# Patient Record
Sex: FEMALE | Race: WHITE | HISPANIC OR LATINO | Employment: FULL TIME | URBAN - METROPOLITAN AREA
[De-identification: names, ages, dates, MRNs, and addresses within clinical notes are randomized per-mention and may not be internally consistent; named-entity substitution may affect disease eponyms.]

---

## 2018-05-17 ENCOUNTER — HOSPITAL PATHOLOGY (OUTPATIENT)
Dept: OTHER | Facility: CLINIC | Age: 43
End: 2018-05-17

## 2018-05-18 LAB — COPATH REPORT: NORMAL

## 2018-08-19 ENCOUNTER — APPOINTMENT (OUTPATIENT)
Dept: ULTRASOUND IMAGING | Facility: CLINIC | Age: 43
End: 2018-08-19
Attending: EMERGENCY MEDICINE
Payer: COMMERCIAL

## 2018-08-19 ENCOUNTER — HOSPITAL ENCOUNTER (EMERGENCY)
Facility: CLINIC | Age: 43
Discharge: HOME | End: 2018-08-19
Attending: EMERGENCY MEDICINE | Admitting: EMERGENCY MEDICINE
Payer: COMMERCIAL

## 2018-08-19 ENCOUNTER — APPOINTMENT (OUTPATIENT)
Dept: CT IMAGING | Facility: CLINIC | Age: 43
End: 2018-08-19
Attending: EMERGENCY MEDICINE
Payer: COMMERCIAL

## 2018-08-19 VITALS
TEMPERATURE: 98.2 F | DIASTOLIC BLOOD PRESSURE: 74 MMHG | BODY MASS INDEX: 25.25 KG/M2 | HEART RATE: 68 BPM | SYSTOLIC BLOOD PRESSURE: 128 MMHG | RESPIRATION RATE: 18 BRPM | WEIGHT: 125 LBS | OXYGEN SATURATION: 100 %

## 2018-08-19 DIAGNOSIS — R10.32 ABDOMINAL PAIN, LEFT LOWER QUADRANT: ICD-10-CM

## 2018-08-19 LAB
ALBUMIN UR-MCNC: NEGATIVE MG/DL
ANION GAP SERPL CALCULATED.3IONS-SCNC: 6 MMOL/L (ref 3–14)
APPEARANCE UR: CLEAR
BASOPHILS # BLD AUTO: 0 10E9/L (ref 0–0.2)
BASOPHILS NFR BLD AUTO: 0.6 %
BILIRUB UR QL STRIP: NEGATIVE
BUN SERPL-MCNC: 12 MG/DL (ref 7–30)
CALCIUM SERPL-MCNC: 7.9 MG/DL (ref 8.5–10.1)
CHLORIDE SERPL-SCNC: 109 MMOL/L (ref 94–109)
CO2 SERPL-SCNC: 25 MMOL/L (ref 20–32)
COLOR UR AUTO: YELLOW
CREAT SERPL-MCNC: 0.68 MG/DL (ref 0.52–1.04)
DIFFERENTIAL METHOD BLD: NORMAL
EOSINOPHIL # BLD AUTO: 0.2 10E9/L (ref 0–0.7)
EOSINOPHIL NFR BLD AUTO: 3.3 %
ERYTHROCYTE [DISTWIDTH] IN BLOOD BY AUTOMATED COUNT: 12.8 % (ref 10–15)
GFR SERPL CREATININE-BSD FRML MDRD: >90 ML/MIN/1.7M2
GLUCOSE SERPL-MCNC: 91 MG/DL (ref 70–99)
GLUCOSE UR STRIP-MCNC: NEGATIVE MG/DL
HCG UR QL: NEGATIVE
HCT VFR BLD AUTO: 39.7 % (ref 35–47)
HGB BLD-MCNC: 13.2 G/DL (ref 11.7–15.7)
HGB UR QL STRIP: ABNORMAL
IMM GRANULOCYTES # BLD: 0 10E9/L (ref 0–0.4)
IMM GRANULOCYTES NFR BLD: 0.5 %
KETONES UR STRIP-MCNC: NEGATIVE MG/DL
LEUKOCYTE ESTERASE UR QL STRIP: NEGATIVE
LYMPHOCYTES # BLD AUTO: 2 10E9/L (ref 0.8–5.3)
LYMPHOCYTES NFR BLD AUTO: 31 %
MCH RBC QN AUTO: 30.7 PG (ref 26.5–33)
MCHC RBC AUTO-ENTMCNC: 33.2 G/DL (ref 31.5–36.5)
MCV RBC AUTO: 92 FL (ref 78–100)
MONOCYTES # BLD AUTO: 0.5 10E9/L (ref 0–1.3)
MONOCYTES NFR BLD AUTO: 8.1 %
MUCOUS THREADS #/AREA URNS LPF: PRESENT /LPF
NEUTROPHILS # BLD AUTO: 3.6 10E9/L (ref 1.6–8.3)
NEUTROPHILS NFR BLD AUTO: 56.5 %
NITRATE UR QL: NEGATIVE
NRBC # BLD AUTO: 0 10*3/UL
NRBC BLD AUTO-RTO: 0 /100
PH UR STRIP: 6 PH (ref 5–7)
PLATELET # BLD AUTO: 235 10E9/L (ref 150–450)
POTASSIUM SERPL-SCNC: 3.8 MMOL/L (ref 3.4–5.3)
RBC # BLD AUTO: 4.3 10E12/L (ref 3.8–5.2)
RBC #/AREA URNS AUTO: 2 /HPF (ref 0–2)
SODIUM SERPL-SCNC: 140 MMOL/L (ref 133–144)
SOURCE: ABNORMAL
SP GR UR STRIP: 1.01 (ref 1–1.03)
SQUAMOUS #/AREA URNS AUTO: <1 /HPF (ref 0–1)
UROBILINOGEN UR STRIP-MCNC: 0 MG/DL (ref 0–2)
WBC # BLD AUTO: 6.4 10E9/L (ref 4–11)
WBC #/AREA URNS AUTO: 1 /HPF (ref 0–5)

## 2018-08-19 PROCEDURE — 81025 URINE PREGNANCY TEST: CPT | Performed by: EMERGENCY MEDICINE

## 2018-08-19 PROCEDURE — 81001 URINALYSIS AUTO W/SCOPE: CPT | Performed by: EMERGENCY MEDICINE

## 2018-08-19 PROCEDURE — 85025 COMPLETE CBC W/AUTO DIFF WBC: CPT | Performed by: EMERGENCY MEDICINE

## 2018-08-19 PROCEDURE — 99284 EMERGENCY DEPT VISIT MOD MDM: CPT | Mod: 25

## 2018-08-19 PROCEDURE — 80048 BASIC METABOLIC PNL TOTAL CA: CPT | Performed by: EMERGENCY MEDICINE

## 2018-08-19 PROCEDURE — 76830 TRANSVAGINAL US NON-OB: CPT

## 2018-08-19 PROCEDURE — 74176 CT ABD & PELVIS W/O CONTRAST: CPT

## 2018-08-19 RX ORDER — IBUPROFEN 200 MG
600 TABLET ORAL EVERY 6 HOURS PRN
Qty: 30 TABLET | Refills: 0 | Status: SHIPPED | OUTPATIENT
Start: 2018-08-19 | End: 2019-05-06

## 2018-08-19 ASSESSMENT — ENCOUNTER SYMPTOMS
NAUSEA: 0
FREQUENCY: 0
DIARRHEA: 0
VOMITING: 0
ABDOMINAL PAIN: 1
BACK PAIN: 1
BLOOD IN STOOL: 0
HEMATURIA: 0
MYALGIAS: 1
DYSURIA: 0
FEVER: 0

## 2018-08-19 NOTE — ED PROVIDER NOTES
History     Chief Complaint:  LLQ Abdominal Pain    HPI   History provided via phone .     Brandi Powers is a 43 year old female who presents with left lower quadrant abdominal pain. The patient reports LLQ abdominal pain since this morning that she describes as a pressure that comes and goes. She also endorses lower back pain. There is nothing that provokes or alleviates her pain, and her pain is improved currently in the ED. She otherwise denies fever, nausea, vomiting, diarrhea, abnormal stools, or unusual urinary symptoms. The patient is s/p ovarian cystectomy of the left side (6/2014), but says her pain today is different. She is currently menstruating.      Allergies:  No known drug allergies     Medications:    Ibuprofen    Past Medical History:    Benign ovarian cyst  - left    Menometrorrhagia     Past Surgical History:    IUD  Ovarian cystectomy  Tubal ligation    Family History:    Hypertension    Social History:  Smoking Status: Never Smoker  Alcohol Use: No  Patient presents with family.  Marital Status:        Review of Systems   Constitutional: Negative for fever.   Gastrointestinal: Positive for abdominal pain. Negative for blood in stool, diarrhea, nausea and vomiting.   Genitourinary: Positive for pelvic pain. Negative for dysuria, frequency, hematuria and urgency.   Musculoskeletal: Positive for back pain and myalgias.   All other systems reviewed and are negative.    Physical Exam     Patient Vitals for the past 24 hrs:   BP Temp Temp src Pulse Resp SpO2 Weight   08/19/18 2243 - - - - - 100 % -   08/19/18 2241 128/74 - - - - - -   08/19/18 2145 (!) 137/95 - - - - 97 % -   08/19/18 2130 128/77 - - - - 100 % -   08/19/18 1900 128/81 - - - - 100 % -   08/19/18 1845 132/84 - - - - 100 % -   08/19/18 1830 127/88 - - - - - -   08/19/18 1758 135/73 98.2  F (36.8  C) Oral 68 18 98 % 56.7 kg (125 lb)     Physical Exam   HENT: Oropharynx clear, mucous membranes moist   EYES:  Extraocular movements intact  CV: Rate as noted,  regular rhythm. no murmurs.   RESP: Effort normal. Breath sounds are normal bilaterally.  GI: Abdomen with mild left lower quadrant tenderness.  NEURO: Alert, moving all extremities  MSK: No deformity of the extremities  SKIN: Warm and dry    Emergency Department Course     Imaging:  Radiographic findings were communicated with the patient who voiced understanding of the findings.    Ultrasound Pelvis Complete w/ Transvag and Doppler:  1. Normal ovaries.  2. Thickened uterine endometrium and small endometrial fluid  collection. Clinical correlation with the patient's menstrual status  is requested.  As read by Radiology.    CT-scan Abdomen/Pelvis w/o contrast:  No acute abnormality.  Preliminary result per radiology.     Laboratory:  CBC: WNL (WBC 6.4, HGB 13.2, )  BMP: Calcium 7.9 (L), o/w WNL (Creatinine 0.68)    UA: Blood small, Mucous present, o/w Negative  HCG Urine: Negative    Emergency Department Course:  Past medical records, nursing notes, and vitals reviewed.  1824: I performed an exam of the patient and obtained history, as documented above.    IV inserted and blood drawn.  The patient provided a urine sample here in the emergency department. This was sent for laboratory testing, findings above.  The patient was sent for a ultrasound while in the emergency department, findings above.    2030: I rechecked the patient. Explained findings to patient. Patient would like a CT scan.     2055: Patient would like to go home without the CT scan.    2101: Patient changes her mind and would like a CT for rule out.     2226: I rechecked the patient. Findings and plan explained to the Patient. Patient discharged home with instructions regarding supportive care, medications, and reasons to return. The importance of close follow-up was reviewed.     Impression & Plan      Medical Decision Making:  This patient presented to the ER with left lower quadrant pain.   The patient did not have any peritoneal signs or significant tenderness upon arrival.  Given the location of the pain I initially considered ovarian processes and obtained a pelvic ultrasound.  This was negative for acute pathology.  Her urinalysis did not show signs of infection.  Her pregnancy testing was negative which makes ectopic unlikely.  She has no leukocytosis to suggest occult infection.  At this point given the waxing and waning nature of the patient's pain, renal stone was still in the differential.  I discussed observation at home versus CT imaging and the patient preferred CT imaging.  This was fortunately negative for any acute pathology.  The patient was discharged home with close follow-up.  Return precautions were discussed.    Diagnosis:    ICD-10-CM    1. Abdominal pain, left lower quadrant R10.32      Disposition:  Discharged to home.    Robel Florez  8/19/2018   [facility] EMERGENCY DEPARTMENT  I, Robel Florez, am serving as a scribe at 6:24 PM on 8/19/2018 to document services personally performed by Joseph Wolfe MD based on my observations and the provider's statements to me.         Joseph Wolfe MD  08/20/18 0223

## 2018-08-19 NOTE — ED AVS SNAPSHOT
[facility] Emergency Department    [address]    [facility] [address]    Phone:  [phone]    Fax:  [phone]                                       Brandi Powers   MRN: [id number]    Department:  [facility] Emergency Department   Date of Visit:  8/19/2018           After Visit Summary Signature Page     I have received my discharge instructions, and my questions have been answered. I have discussed any challenges I see with this plan with the nurse or doctor.    ..........................................................................................................................................  Patient/Patient Representative Signature      ..........................................................................................................................................  Patient Representative Print Name and Relationship to Patient    ..................................................               ................................................  Date                                            Time    ..........................................................................................................................................  Reviewed by Signature/Title    ...................................................              ..............................................  Date                                                            Time

## 2018-08-19 NOTE — ED AVS SNAPSHOT
[facility] Emergency Department    [address] [place] [address]    Phone:  [phone]    Fax:  [phone]                                       Brandi Powers   MRN: [id number]    Department:  [facility] Emergency Department   Date of Visit:  8/19/2018           Patient Information     Date Of Birth          1975        Your diagnoses for this visit were:     Abdominal pain, left lower quadrant        You were seen by Joseph Wolfe MD.      Follow-up Information     Follow up with [facility] Emergency Department.    Specialty:  EMERGENCY MEDICINE    Why:  As needed, If symptoms worsen    Contact information:    [address] [place] [id number] [phone]        Follow up with [facility], [place]. Schedule an appointment as soon as possible for a visit in 1 day.    Specialty:  Family Practice    Why:  follow up of ER visit    Contact information:    [address] [place] [address]  [phone]          Discharge Instructions       Discharge Instructions  Abdominal Pain    Abdominal pain (belly pain) can be caused by many things. Your evaluation today does not show the exact cause for your pain. Your provider today has decided that it is unlikely your pain is due to a life threatening problem, or a problem requiring surgery or hospital admission. Sometimes those problems cannot be found right away, so it is very important that you follow up as directed.  Sometimes only the changes which occur over time allow the cause of your pain to be found.    Generally, every Emergency Department visit should have a follow-up clinic visit with either a primary or a specialty clinic/provider. Please follow-up as instructed by your emergency provider today. With abdominal pain, we often recommend very close follow-up, such as the following day.    ADULTS:  Return to the Emergency Department right away if:      You get an  oral temperature above 102oF or as directed by your provider.    You have blood in your stools. This may be bright red or appear as black, tarry stools.      You keep vomiting (throwing up) or cannot drink liquids.    You see blood when you vomit.     You cannot have a bowel movement or you cannot pass gas.    Your stomach gets bloated or bigger.    Your skin or the whites of your eyes look yellow.    You faint.    You have bloody, frequent or painful urination (peeing).    You have new symptoms or anything that worries you.    CHILDREN:  Return to the Emergency Department right away if your child has any of the above-listed symptoms or the following:      Pushes your hand away or screams/cries when his/her belly is touched.    You notice your child is very fussy or weak.    Your child is very tired and is too tired to eat or drink.    Your child is dehydrated.  Signs of dehydration can be:  o Significant change in the amount of wet diapers/urine.  o Your infant or child starts to have dry mouth and lips, or no saliva (spit) or tears.    PREGNANT WOMEN:  Return to the Emergency Department right away if you have any of the above-listed symptoms or the following:      You have bleeding, leaking fluid or passing tissue from the vagina.    You have worse pain or cramping, or pain in your shoulder or back.    You have vomiting that will not stop.    You have a temperature of 100oF or more.    Your baby is not moving as much as usual.    You faint.    You get a bad headache with or without eye problems and abdominal pain.    You have a seizure.    You have unusual discharge from your vagina and abdominal pain.    Abdominal pain is pretty common during pregnancy.  Your pain may or may not be related to your pregnancy. You should follow-up closely with your OB provider so they can evaluate you and your baby.  Until you follow-up with your regular provider, do the following:       Avoid sex and do not put anything in your  "vagina.    Drink clear fluids.    Only take medications approved by your provider.    MORE INFORMATION:    Appendicitis:  A possible cause of abdominal pain in any person who still has their appendix is acute appendicitis. Appendicitis is often hard to diagnose.  Testing does not always rule out early appendicitis or other causes of abdominal pain. Close follow-up with your provider and re-evaluations may be needed to figure out the reason for your abdominal pain.    Follow-up:  It is very important that you make an appointment with your clinic and go to the appointment.  If you do not follow-up with your primary provider, it may result in missing an important development which could result in permanent injury or disability and/or lasting pain.  If there is any problem keeping your appointment, call your provider or return to the Emergency Department.    Medications:  Take your medications as directed by your provider today.  Before using over-the-counter medications, ask your provider and make sure to take the medications as directed.  If you have any questions about medications, ask your provider.    Diet:  Resume your normal diet as much as possible, but do not eat fried, fatty or spicy foods while you have pain.  Do not drink alcohol or have caffeine.  Do not smoke tobacco.    Probiotics: If you have been given an antibiotic, you may want to also take a probiotic pill or eat yogurt with live cultures. Probiotics have \"good bacteria\" to help your intestines stay healthy. Studies have shown that probiotics help prevent diarrhea (loose stools) and other intestine problems (including C. diff infection) when you take antibiotics. You can buy these without a prescription in the pharmacy section of the store.     If you were given a prescription for medicine here today, be sure to read all of the information (including the package insert) that comes with your prescription.  This will include important information about " the medicine, its side effects, and any warnings that you need to know about.  The pharmacist who fills the prescription can provide more information and answer questions you may have about the medicine.  If you have questions or concerns that the pharmacist cannot address, please call or return to the Emergency Department.       Remember that you can always come back to the Emergency Department if you are not able to see your regular provider in the amount of time listed above, if you get any new symptoms, or if there is anything that worries you.      24 Hour Appointment Hotline       To make an appointment at any [facility], call [phone] ([phone]). If you don't have a family doctor or clinic, we will help you find one. [facility] are conveniently located to serve the needs of you and your family.             Review of your medicines      CONTINUE these medicines which may have CHANGED, or have new prescriptions. If we are uncertain of the size of tablets/capsules you have at home, strength may be listed as something that might have changed.        Dose / Directions Last dose taken    ibuprofen 200 MG tablet   Commonly known as:  ADVIL/MOTRIN   Dose:  600 mg   What changed:    - medication strength  - how much to take  - when to take this  - reasons to take this   Quantity:  30 tablet        Take 3 tablets (600 mg) by mouth every 6 hours as needed for mild pain, pain or moderate pain   Refills:  0                Prescriptions were sent or printed at these locations (1 Prescription)                   Other Prescriptions                Printed at Department/Unit printer (1 of 1)         ibuprofen (ADVIL/MOTRIN) 200 MG tablet                Procedures and tests performed during your visit     Basic metabolic panel    CBC with platelets differential    CT Abdomen Pelvis w/o Contrast    HCG qualitative urine (UPT)    UA with Microscopic    US Pelvis Cmplt w Transvag & Doppler LmtPel Duplex  Limited      Orders Needing Specimen Collection     None      Pending Results     No orders found from 8/17/2018 to 8/20/2018.            Pending Culture Results     No orders found from 8/17/2018 to 8/20/2018.            Pending Results Instructions     If you had any lab results that were not finalized at the time of your Discharge, you can call the ED Lab Result RN at [phone]. You will be contacted by this team for any positive Lab results or changes in treatment. The nurses are available 7 days a week from 10A to 6:30P.  You can leave a message 24 hours per day and they will return your call.        Test Results From Your Hospital Stay        8/19/2018  6:57 PM      Component Results     Component Value Ref Range & Units Status    WBC 6.4 4.0 - 11.0 10e9/L Final    RBC Count 4.30 3.8 - 5.2 10e12/L Final    Hemoglobin 13.2 11.7 - 15.7 g/dL Final    Hematocrit 39.7 35.0 - 47.0 % Final    MCV 92 78 - 100 fl Final    MCH 30.7 26.5 - 33.0 pg Final    MCHC 33.2 31.5 - 36.5 g/dL Final    RDW 12.8 10.0 - 15.0 % Final    Platelet Count 235 150 - 450 10e9/L Final    Diff Method Automated Method  Final    % Neutrophils 56.5 % Final    % Lymphocytes 31.0 % Final    % Monocytes 8.1 % Final    % Eosinophils 3.3 % Final    % Basophils 0.6 % Final    % Immature Granulocytes 0.5 % Final    Nucleated RBCs 0 0 /100 Final    Absolute Neutrophil 3.6 1.6 - 8.3 10e9/L Final    Absolute Lymphocytes 2.0 0.8 - 5.3 10e9/L Final    Absolute Monocytes 0.5 0.0 - 1.3 10e9/L Final    Absolute Eosinophils 0.2 0.0 - 0.7 10e9/L Final    Absolute Basophils 0.0 0.0 - 0.2 10e9/L Final    Abs Immature Granulocytes 0.0 0 - 0.4 10e9/L Final    Absolute Nucleated RBC 0.0  Final         8/19/2018  7:10 PM      Component Results     Component Value Ref Range & Units Status    Sodium 140 133 - 144 mmol/L Final    Potassium 3.8 3.4 - 5.3 mmol/L Final    Chloride 109 94 - 109 mmol/L Final    Carbon Dioxide 25 20 - 32 mmol/L Final    Anion Gap 6 3 - 14  mmol/L Final    Glucose 91 70 - 99 mg/dL Final    Urea Nitrogen 12 7 - 30 mg/dL Final    Creatinine 0.68 0.52 - 1.04 mg/dL Final    GFR Estimate >90 >60 mL/min/1.7m2 Final    Non  GFR Calc    GFR Estimate If Black >90 >60 mL/min/1.7m2 Final    African American GFR Calc    Calcium 7.9 (L) 8.5 - 10.1 mg/dL Final         8/19/2018  6:56 PM      Component Results     Component Value Ref Range & Units Status    Color Urine Yellow  Final    Appearance Urine Clear  Final    Glucose Urine Negative NEG^Negative mg/dL Final    Bilirubin Urine Negative NEG^Negative Final    Ketones Urine Negative NEG^Negative mg/dL Final    Specific Gravity Urine 1.008 1.003 - 1.035 Final    Blood Urine Small (A) NEG^Negative Final    pH Urine 6.0 5.0 - 7.0 pH Final    Protein Albumin Urine Negative NEG^Negative mg/dL Final    Urobilinogen mg/dL 0.0 0.0 - 2.0 mg/dL Final    Nitrite Urine Negative NEG^Negative Final    Leukocyte Esterase Urine Negative NEG^Negative Final    Source Midstream Urine  Final    WBC Urine 1 0 - 5 /HPF Final    RBC Urine 2 0 - 2 /HPF Final    Squamous Epithelial /HPF Urine <1 0 - 1 /HPF Final    Mucous Urine Present (A) NEG^Negative /LPF Final         8/19/2018  6:57 PM      Component Results     Component Value Ref Range & Units Status    HCG Qual Urine Negative NEG^Negative Final    This test is for screening purposes.  Results should be interpreted along with   the clinical picture.  Confirmation testing is available if warranted by   ordering [id number], HCG Quantitative Pregnancy.           8/19/2018  7:44 PM      Narrative     US PELVIS COMPLETE W TRANSVAGINAL AND DOPPLER LIMITED 8/19/2018 7:39  PM    HISTORY: Pelvic pain.    COMPARISON: None.    FINDINGS:  Transvaginal images were performed to better evaluate the  patient's uterus, ovaries and endometrial stripe.    The uterus measures 10.0 x 6.3 x 5.1 cm. Uterine fibroid measures 1.5  cm in maximum dimension. Endometrial stripe thickness is 1.4 cm.  Small  fluid collection in the lower uterine segment measures 0.9 cm.    Right ovary measures 3.1 cm. Left ovary measures 2.5 cm. No evidence  of ovarian cyst. Ovarian vascularity is normal bilaterally by waveform  Doppler.        Impression     IMPRESSION:  1. Normal ovaries.  2. Thickened uterine endometrium and small endometrial fluid  collection. Clinical correlation with the patient's menstrual status  is requested.    RENZO BRUNER MD         8/19/2018 10:10 PM      Narrative     CT ABDOMEN PELVIS W/O CONTRAST 8/19/2018 10:01 PM    HISTORY: Waxing and waning left lower quadrant pain.    COMPARISON: None.    TECHNIQUE: Noncontrast abdomen and pelvis CT.  Radiation dose for this  scan was reduced using automated exposure control, adjustment of the  mA and/or kV according to patient size, or iterative reconstruction  technique.    FINDINGS: Lung bases are clear.    Within limits of noncontrast technique: Liver, spleen, pancreas,  adrenal glands and kidneys appear normal.    Normal caliber bowel. Appendix is visualized and normal. No free air.  No free or loculated fluid collection.    Urinary bladder is distended with normal wall thickness. Uterus and  adnexa appear normal.        Impression     IMPRESSION: No acute abnormality.    RENZO BRUNER MD                Clinical Quality Measure: Blood Pressure Screening     Your blood pressure was checked while you were in the emergency department today. The last reading we obtained was  BP: (!) 137/95 . Please read the guidelines below about what these numbers mean and what you should do about them.  If your systolic blood pressure (the top number) is less than 120 and your diastolic blood pressure (the bottom number) is less than 80, then your blood pressure is normal. There is nothing more that you need to do about it.  If your systolic blood pressure (the top number) is 120-139 or your diastolic blood pressure (the bottom number) is 80-89, your blood pressure may be  "higher than it should be. You should have your blood pressure rechecked within a year by a primary care provider.  If your systolic blood pressure (the top number) is 140 or greater or your diastolic blood pressure (the bottom number) is 90 or greater, you may have high blood pressure. High blood pressure is treatable, but if left untreated over time it can put you at risk for heart attack, stroke, or kidney failure. You should have your blood pressure rechecked by a primary care provider within the next 4 weeks.  If your provider in the emergency department today gave you specific instructions to follow-up with your doctor or provider even sooner than that, you should follow that instruction and not wait for up to 4 weeks for your follow-up visit.        Thank you for choosing [facility]       Thank you for choosing [facility] for your care. Our goal is always to provide you with excellent care. Hearing back from our patients is one way we can continue to improve our services. Please take a few minutes to complete the written survey that you may receive in the mail after you visit with us. Thank you!        [email or web address] Information     [employer] lets you send messages to your doctor, view your test results, renew your prescriptions, schedule appointments and more. To sign up, go to [email or web address] . Click on \"Log in\" on the left side of the screen, which will take you to the Welcome page. Then click on \"Sign up Now\" on the right side of the page.     You will be asked to enter the access code listed below, as well as some personal information. Please follow the directions to create your username and password.     Your access code is: [email or web address]  Expires: 2018 10:37 PM     Your access code will  in 90 days. If you need help or a new code, please call your [facility] clinic or [phone].        Care EveryWhere ID     This is your Care EveryWhere ID. This could be used by other organizations to access your " [facility] medical records  [id number]        Equal Access to Services     JENNIFER VALDEZ : Hadii nuno Call, shanon manning, chanda galo, shweta oshea. So [facility] [phone].    ATENCIÓN: Si habla español, tiene a plummer disposición servicios gratuitos de asistencia lingüística. Llame al [phone].    We comply with applicable federal civil rights laws and [place] laws. We do not discriminate on the basis of race, color, national origin, age, disability, sex, sexual orientation, or gender identity.            After Visit Summary       This is your record. Keep this with you and show to your community pharmacist(s) and doctor(s) at your next visit.

## 2018-08-19 NOTE — ED TRIAGE NOTES
LLQ abd pain and left lower back pain since this morning. Denies urinary symptoms, no nausea or vomiting.  ABCs intact.  Patient is alert and oriented x3.

## 2019-04-04 ASSESSMENT — MIFFLIN-ST. JEOR: SCORE: 1161.18

## 2019-04-09 ENCOUNTER — ANESTHESIA (OUTPATIENT)
Dept: SURGERY | Facility: CLINIC | Age: 44
End: 2019-04-09
Payer: COMMERCIAL

## 2019-04-09 ENCOUNTER — OFFICE VISIT (OUTPATIENT)
Dept: INTERPRETER SERVICES | Facility: CLINIC | Age: 44
End: 2019-04-09
Payer: COMMERCIAL

## 2019-04-09 ENCOUNTER — ANESTHESIA EVENT (OUTPATIENT)
Dept: SURGERY | Facility: CLINIC | Age: 44
End: 2019-04-09
Payer: COMMERCIAL

## 2019-04-09 ENCOUNTER — HOSPITAL ENCOUNTER (OUTPATIENT)
Facility: CLINIC | Age: 44
Discharge: HOME | End: 2019-04-09
Attending: OBSTETRICS & GYNECOLOGY | Admitting: OBSTETRICS & GYNECOLOGY
Payer: COMMERCIAL

## 2019-04-09 VITALS
HEART RATE: 72 BPM | OXYGEN SATURATION: 100 % | DIASTOLIC BLOOD PRESSURE: 74 MMHG | TEMPERATURE: 97.1 F | HEIGHT: 60 IN | BODY MASS INDEX: 25.32 KG/M2 | SYSTOLIC BLOOD PRESSURE: 135 MMHG | WEIGHT: 129 LBS | RESPIRATION RATE: 16 BRPM

## 2019-04-09 DIAGNOSIS — Z98.890 STATUS POST HYSTEROSCOPY: Primary | ICD-10-CM

## 2019-04-09 DIAGNOSIS — N84.0 ENDOMETRIAL POLYP: ICD-10-CM

## 2019-04-09 PROCEDURE — 25000128 H RX IP 250 OP 636

## 2019-04-09 PROCEDURE — 25000125 ZZHC RX 250

## 2019-04-09 PROCEDURE — 36000058 ZZH SURGERY LEVEL 3 EA 15 ADDTL MIN: Performed by: OBSTETRICS & GYNECOLOGY

## 2019-04-09 PROCEDURE — 37000009 ZZH ANESTHESIA TECHNICAL FEE, EACH ADDTL 15 MIN: Performed by: OBSTETRICS & GYNECOLOGY

## 2019-04-09 PROCEDURE — 71000027 ZZH RECOVERY PHASE 2 EACH 15 MINS: Performed by: OBSTETRICS & GYNECOLOGY

## 2019-04-09 PROCEDURE — 88305 TISSUE EXAM BY PATHOLOGIST: CPT | Performed by: OBSTETRICS & GYNECOLOGY

## 2019-04-09 PROCEDURE — 25000125 ZZHC RX 250: Performed by: OBSTETRICS & GYNECOLOGY

## 2019-04-09 PROCEDURE — T1013 SIGN LANG/ORAL INTERPRETER: HCPCS | Mod: U3

## 2019-04-09 PROCEDURE — 88305 TISSUE EXAM BY PATHOLOGIST: CPT | Mod: 26 | Performed by: OBSTETRICS & GYNECOLOGY

## 2019-04-09 PROCEDURE — 27210794 ZZH OR GENERAL SUPPLY STERILE: Performed by: OBSTETRICS & GYNECOLOGY

## 2019-04-09 PROCEDURE — 36000056 ZZH SURGERY LEVEL 3 1ST 30 MIN: Performed by: OBSTETRICS & GYNECOLOGY

## 2019-04-09 PROCEDURE — 37000008 ZZH ANESTHESIA TECHNICAL FEE, 1ST 30 MIN: Performed by: OBSTETRICS & GYNECOLOGY

## 2019-04-09 PROCEDURE — 25000125 ZZHC RX 250: Performed by: ANESTHESIOLOGY

## 2019-04-09 PROCEDURE — 40000306 ZZH STATISTIC PRE PROC ASSESS II: Performed by: OBSTETRICS & GYNECOLOGY

## 2019-04-09 PROCEDURE — 25800030 ZZH RX IP 258 OP 636: Performed by: ANESTHESIOLOGY

## 2019-04-09 RX ORDER — HYDROMORPHONE HYDROCHLORIDE 1 MG/ML
.3-.5 INJECTION, SOLUTION INTRAMUSCULAR; INTRAVENOUS; SUBCUTANEOUS EVERY 10 MIN PRN
Status: DISCONTINUED | OUTPATIENT
Start: 2019-04-09 | End: 2019-04-09 | Stop reason: HOSPADM

## 2019-04-09 RX ORDER — ONDANSETRON 2 MG/ML
INJECTION INTRAMUSCULAR; INTRAVENOUS PRN
Status: DISCONTINUED | OUTPATIENT
Start: 2019-04-09 | End: 2019-04-09

## 2019-04-09 RX ORDER — LIDOCAINE HYDROCHLORIDE 10 MG/ML
INJECTION, SOLUTION EPIDURAL; INFILTRATION; INTRACAUDAL; PERINEURAL PRN
Status: DISCONTINUED | OUTPATIENT
Start: 2019-04-09 | End: 2019-04-09 | Stop reason: HOSPADM

## 2019-04-09 RX ORDER — FENTANYL CITRATE 50 UG/ML
25-50 INJECTION, SOLUTION INTRAMUSCULAR; INTRAVENOUS
Status: DISCONTINUED | OUTPATIENT
Start: 2019-04-09 | End: 2019-04-09 | Stop reason: HOSPADM

## 2019-04-09 RX ORDER — ONDANSETRON 2 MG/ML
4 INJECTION INTRAMUSCULAR; INTRAVENOUS EVERY 30 MIN PRN
Status: DISCONTINUED | OUTPATIENT
Start: 2019-04-09 | End: 2019-04-09 | Stop reason: HOSPADM

## 2019-04-09 RX ORDER — IBUPROFEN 600 MG/1
600 TABLET, FILM COATED ORAL EVERY 6 HOURS PRN
Qty: 30 TABLET | Refills: 0 | Status: SHIPPED | OUTPATIENT
Start: 2019-04-09 | End: 2019-05-06

## 2019-04-09 RX ORDER — SODIUM CHLORIDE, SODIUM LACTATE, POTASSIUM CHLORIDE, CALCIUM CHLORIDE 600; 310; 30; 20 MG/100ML; MG/100ML; MG/100ML; MG/100ML
INJECTION, SOLUTION INTRAVENOUS CONTINUOUS
Status: DISCONTINUED | OUTPATIENT
Start: 2019-04-09 | End: 2019-04-09 | Stop reason: HOSPADM

## 2019-04-09 RX ORDER — ONDANSETRON 4 MG/1
4 TABLET, ORALLY DISINTEGRATING ORAL EVERY 30 MIN PRN
Status: DISCONTINUED | OUTPATIENT
Start: 2019-04-09 | End: 2019-04-09 | Stop reason: HOSPADM

## 2019-04-09 RX ORDER — NALOXONE HYDROCHLORIDE 0.4 MG/ML
.1-.4 INJECTION, SOLUTION INTRAMUSCULAR; INTRAVENOUS; SUBCUTANEOUS
Status: DISCONTINUED | OUTPATIENT
Start: 2019-04-09 | End: 2019-04-09 | Stop reason: HOSPADM

## 2019-04-09 RX ORDER — MEPERIDINE HYDROCHLORIDE 25 MG/ML
12.5 INJECTION INTRAMUSCULAR; INTRAVENOUS; SUBCUTANEOUS
Status: DISCONTINUED | OUTPATIENT
Start: 2019-04-09 | End: 2019-04-09 | Stop reason: HOSPADM

## 2019-04-09 RX ORDER — FENTANYL CITRATE 50 UG/ML
INJECTION, SOLUTION INTRAMUSCULAR; INTRAVENOUS PRN
Status: DISCONTINUED | OUTPATIENT
Start: 2019-04-09 | End: 2019-04-09

## 2019-04-09 RX ORDER — LABETALOL HYDROCHLORIDE 5 MG/ML
10 INJECTION, SOLUTION INTRAVENOUS
Status: DISCONTINUED | OUTPATIENT
Start: 2019-04-09 | End: 2019-04-09 | Stop reason: HOSPADM

## 2019-04-09 RX ORDER — HYDRALAZINE HYDROCHLORIDE 20 MG/ML
2.5-5 INJECTION INTRAMUSCULAR; INTRAVENOUS EVERY 10 MIN PRN
Status: DISCONTINUED | OUTPATIENT
Start: 2019-04-09 | End: 2019-04-09 | Stop reason: HOSPADM

## 2019-04-09 RX ORDER — DEXAMETHASONE SODIUM PHOSPHATE 4 MG/ML
INJECTION, SOLUTION INTRA-ARTICULAR; INTRALESIONAL; INTRAMUSCULAR; INTRAVENOUS; SOFT TISSUE PRN
Status: DISCONTINUED | OUTPATIENT
Start: 2019-04-09 | End: 2019-04-09

## 2019-04-09 RX ORDER — KETOROLAC TROMETHAMINE 30 MG/ML
INJECTION, SOLUTION INTRAMUSCULAR; INTRAVENOUS PRN
Status: DISCONTINUED | OUTPATIENT
Start: 2019-04-09 | End: 2019-04-09

## 2019-04-09 RX ORDER — LIDOCAINE 40 MG/G
CREAM TOPICAL
Status: DISCONTINUED | OUTPATIENT
Start: 2019-04-09 | End: 2019-04-09 | Stop reason: HOSPADM

## 2019-04-09 RX ORDER — IBUPROFEN 600 MG/1
600 TABLET, FILM COATED ORAL
Status: DISCONTINUED | OUTPATIENT
Start: 2019-04-09 | End: 2019-04-09 | Stop reason: HOSPADM

## 2019-04-09 RX ORDER — PROPOFOL 10 MG/ML
INJECTION, EMULSION INTRAVENOUS CONTINUOUS PRN
Status: DISCONTINUED | OUTPATIENT
Start: 2019-04-09 | End: 2019-04-09

## 2019-04-09 RX ADMIN — LIDOCAINE HYDROCHLORIDE 50 MG: 10 INJECTION, SOLUTION EPIDURAL; INFILTRATION; INTRACAUDAL; PERINEURAL at 12:33

## 2019-04-09 RX ADMIN — PROPOFOL 140 MCG/KG/MIN: 10 INJECTION, EMULSION INTRAVENOUS at 12:34

## 2019-04-09 RX ADMIN — FENTANYL CITRATE 50 MCG: 50 INJECTION, SOLUTION INTRAMUSCULAR; INTRAVENOUS at 12:32

## 2019-04-09 RX ADMIN — DEXAMETHASONE SODIUM PHOSPHATE 4 MG: 4 INJECTION, SOLUTION INTRA-ARTICULAR; INTRALESIONAL; INTRAMUSCULAR; INTRAVENOUS; SOFT TISSUE at 12:43

## 2019-04-09 RX ADMIN — ONDANSETRON 4 MG: 2 INJECTION INTRAMUSCULAR; INTRAVENOUS at 12:43

## 2019-04-09 RX ADMIN — KETOROLAC TROMETHAMINE 30 MG: 30 INJECTION, SOLUTION INTRAMUSCULAR at 12:43

## 2019-04-09 RX ADMIN — SODIUM CHLORIDE, POTASSIUM CHLORIDE, SODIUM LACTATE AND CALCIUM CHLORIDE: 600; 310; 30; 20 INJECTION, SOLUTION INTRAVENOUS at 11:52

## 2019-04-09 RX ADMIN — MIDAZOLAM 2 MG: 1 INJECTION INTRAMUSCULAR; INTRAVENOUS at 12:27

## 2019-04-09 ASSESSMENT — MIFFLIN-ST. JEOR: SCORE: 1156.64

## 2019-04-09 NOTE — ANESTHESIA CARE TRANSFER NOTE
Patient: Brandi Powers    Procedure(s):  Hysteroscopy, dilation and curettage, polypectomy with morcellator (MAC WITH LOCAL)    Diagnosis: Pelvic pain, endometrial polyp  Diagnosis Additional Information: No value filed.    Anesthesia Type:   MAC     Note:  Airway :Face Mask  Patient transferred to:Phase II  Comments: Pt to PACU, VSS, report to RNHandoff Report: Identifed the Patient, Identified the Reponsible Provider, Reviewed the pertinent medical history, Discussed the surgical course, Reviewed Intra-OP anesthesia mangement and issues during anesthesia, Set expectations for post-procedure period and Allowed opportunity for questions and acknowledgement of understanding      Vitals: (Last set prior to Anesthesia Care Transfer)    CRNA VITALS  4/9/2019 1233 - 4/9/2019 1312      4/9/2019             Pulse:  103    SpO2:  98 %                Electronically Signed By: MARISOL Farley CRNA  April 9, 2019  1:12 PM

## 2019-04-09 NOTE — DISCHARGE INSTRUCTIONS
GENERAL ANESTHESIA OR SEDATION ADULT DISCHARGE INSTRUCTIONS   SPECIAL PRECAUTIONS FOR 24 HOURS AFTER SURGERY    IT IS NOT UNUSUAL TO FEEL LIGHT-HEADED OR FAINT, UP TO 24 HOURS AFTER SURGERY OR WHILE TAKING PAIN MEDICATION.  IF YOU HAVE THESE SYMPTOMS; SIT FOR A FEW MINUTES BEFORE STANDING AND HAVE SOMEONE ASSIST YOU WHEN YOU GET UP TO WALK OR USE THE BATHROOM.    YOU SHOULD REST AND RELAX FOR THE NEXT 24 HOURS AND YOU MUST MAKE ARRANGEMENTS TO HAVE SOMEONE STAY WITH YOU FOR AT LEAST 24 HOURS AFTER YOUR DISCHARGE.  AVOID HAZARDOUS AND STRENUOUS ACTIVITIES.  DO NOT MAKE IMPORTANT DECISIONS FOR 24 HOURS.    DO NOT DRIVE ANY VEHICLE OR OPERATE MECHANICAL EQUIPMENT FOR 24 HOURS FOLLOWING THE END OF YOUR SURGERY.  EVEN THOUGH YOU MAY FEEL NORMAL, YOUR REACTIONS MAY BE AFFECTED BY THE MEDICATION YOU HAVE RECEIVED.    DO NOT DRINK ALCOHOLIC BEVERAGES FOR 24 HOURS FOLLOWING YOUR SURGERY.    DRINK CLEAR LIQUIDS (APPLE JUICE, GINGER ALE, 7-UP, BROTH, ETC.).  PROGRESS TO YOUR REGULAR DIET AS YOU FEEL ABLE.    YOU MAY HAVE A DRY MOUTH, A SORE THROAT, MUSCLES ACHES OR TROUBLE SLEEPING.  THESE SHOULD GO AWAY AFTER 24 HOURS.    CALL YOUR DOCTOR FOR ANY OF THE FOLLOWING:  SIGNS OF INFECTION (FEVER, GROWING TENDERNESS AT THE SURGERY SITE, A LARGE AMOUNT OF DRAINAGE OR BLEEDING, SEVERE PAIN, FOUL-SMELLING DRAINAGE, REDNESS OR SWELLING.    IT HAS BEEN OVER 8 TO 10 HOURS SINCE SURGERY AND YOU ARE STILL NOT ABLE TO URINATE (PASS WATER).       DILATION AND CURETTAGE  DISCHARGE INSTRUCTIONS    PLEASE RETURN TO THE CLINIC IN:  2 Weeks  MAKE THIS APPOINTMENT AFTER YOU GET HOME IF IT HAS NOT ALREADY BEEN SCHEDULED.    DO NOT DRIVE A CAR, DRINK ALCOHOL OR USE MACHINERY FOR THE NEXT 24 HOURS.  YOU SHOULD WAIT UNTIL YOU HAVE RECOVERED BEFORE MAKING ANY IMPORTANT DECISIONS.    PAIN AND DISCOMFORT  YOU MAY HAVE CRAMPS OR A LOW BACKACHE FOR 24 TO 48 HOURS.  TYLENOL (ACETAMINOPHEN) OR MOTRIN (IBUPROFEN) MAY HELP, OR YOUR DOCTOR MAY GIVE YOU PAIN  MEDICINE.  CALL YOUR DOCTOR IF PAIN CANNOT BE CONTROLLED.  YOU MAY FEEL DROWSY AND WEAK FOR A DAY OR TWO.    VAGINAL DISCHARGE  YOU MAY HAVE SOME BLEEDING OR DISCHARGE FOR UP TO TWO WEEKS.  DO NOT DOUCHE, USE TAMPONS OR HAVE SEX (INTERCOURSE) IN THE FIRST WEEK.  CALL YOUR DOCTOR IF YOU SOAK MORE THAN ONE MAXI PAD (SANITARY [place]APKIN) PER HOUR, OR IF YOU PASS LARGE BLOOD CLOTS.    OTHER SYMPTOMS  YOU MAY HAVE A LOW FEVER FOR THE FIRST TWO DAYS.  CALL YOUR DOCTOR IF YOUR FEVER GOES OVER 101 DEGREES FAHRENHEIT.    IF YOU HAVE NAUSEA (FEEL SICK TO YOUR STOMACH), STAY IN BED.  TRY DRINKING A SMALL AMOUNT 7-UP, TEA OR SOUP.    DIET AND ACTIVITY  EAT LIGHT MEALS AND DRINK PLENTY OF FLUIDS FOR THE FIRST 24 HOURS (OR LONGER, IF YOU HAVE NAUSEA).    YOU MAY BATHE, SHOWER AND CLIMB STAIRS.  MOST WOMEN CAN RETURN TO WORK AFTER 24 HOURS.  YOU MAY GO BACK TO YOUR OTHER ACTIVITIES AFTER YOUR PAIN GOES AWAY.          You received Toradol, an IV form of ibuprofen (Motrin) at 12:45pm.  Do not take any ibuprofen products until 6:45pm.

## 2019-04-09 NOTE — ANESTHESIA POSTPROCEDURE EVALUATION
Patient: Brandi Powers    Procedure(s):  Hysteroscopy, dilation and curettage, polypectomy with morcellator (MAC WITH LOCAL)    Diagnosis:Pelvic pain, endometrial polyp  Diagnosis Additional Information: Patient: Brandi Powers  MRN: [id number]     Pre-operative diagnosis: Pelvic pain  Abnormal uterine bleeding  Post-operative diagnosis: Same  Procedure: Hysteroscopy, dilation and curettage, polypectomy  Surgeon: Nelia Armijo MD  Anesthesia: M, AC (monitored anesthesia care)  Estimated blood loss: 5 mL  Total IV fluids: 600ml  Blood transfusion: No transfusion was given during surgery  Total urine output: None  Drains: None  Specimens: Endometrial polyp, endometrial curetting  Findings: End, ometrial polyp is visualized eminating from the posterior uterine fundus. It is somewhat irregular in appearance. The remainder of the uterus is normal and tubal ostia are visualized bilaterally.  Complications: None  Condition: Stable         Anesthesia Type:  MAC    Note:  Anesthesia Post Evaluation    Patient location during evaluation: PACU  Patient participation: Able to fully participate in evaluation  Level of consciousness: awake  Pain management: adequate  Airway patency: patent  Cardiovascular status: acceptable  Respiratory status: acceptable  Hydration status: acceptable  PONV: none     Anesthetic complications: None          Last vitals:  Vitals:    04/09/19 1315 04/09/19 1325 04/09/19 1357   BP: 119/73 125/74 135/74   Pulse:      Resp: 16 16    Temp:   97.1  F (36.2  C)   SpO2: 100% 100% 100%         Electronically Signed By: Santi Petersen MD  April 9, 2019  2:28 PM

## 2019-04-09 NOTE — ANESTHESIA PREPROCEDURE EVALUATION
"Anesthesia Pre-Procedure Evaluation    Patient: Brandi Powers   MRN: [id number] : 1975          Preoperative Diagnosis: Pelvic pain, endometrial polyp    Procedure(s):  Hysteroscopy, dilation and curettage, polypectomy with morcellator (MAC WITH LOCAL)    History reviewed. No pertinent past medical history.  Past Surgical History:   Procedure Laterality Date     BLADDER SURGERY      \" pain in my bladder fixed\"     IUD removal, laparoscopy, bilateral tubal ligation, excision of left ovarian mass probable fibroma  2014     LAPAROSCOPIC CYSTECTOMY OVARIAN (BENIGN)  6/10/2014    Procedure: LAPAROSCOPIC CYSTECTOMY OVARIAN (BENIGN);  Surgeon: Sam Ferrari MD;  Location: [facility] OR     LAPAROSCOPIC TUBAL LIGATION  6/10/2014    Procedure: LAPAROSCOPIC TUBAL LIGATION;  Surgeon: Sam Ferrari MD;  Location: [facility] OR     REMOVE INTRAUTERINE DEVICE  6/10/2014    Procedure: REMOVE INTRAUTERINE DEVICE;  Surgeon: Sam Ferrari MD;  Location: [facility] OR     Anesthesia Evaluation     . Pt has had prior anesthetic.     No history of anesthetic complications          ROS/MED HX    ENT/Pulmonary:  - neg pulmonary ROS     Neurologic:  - neg neurologic ROS     Cardiovascular:  - neg cardiovascular ROS       METS/Exercise Tolerance:     Hematologic:  - neg hematologic  ROS       Musculoskeletal:  - neg musculoskeletal ROS       GI/Hepatic:  - neg GI/hepatic ROS       Renal/Genitourinary:  - ROS Renal section negative       Endo:  - neg endo ROS       Psychiatric:  - neg psychiatric ROS       Infectious Disease:  - neg infectious disease ROS       Malignancy:      - no malignancy   Other:                          Physical Exam  Normal systems: cardiovascular and pulmonary    Airway   Mallampati: II  TM distance: >3 FB  Neck ROM: full    Dental     Cardiovascular       Pulmonary             Lab Results   Component Value Date    WBC 6.4 2018    HGB 13.2 2018    HCT 39.7 2018     " 08/19/2018     08/19/2018    POTASSIUM 3.8 08/19/2018    CHLORIDE 109 08/19/2018    CO2 25 08/19/2018    BUN 12 08/19/2018    CR 0.68 08/19/2018    GLC 91 08/19/2018    JUANJOSE 7.9 (L) 08/19/2018    HCG Negative 08/19/2018       Preop Vitals  BP Readings from Last 3 Encounters:   08/19/18 128/74   06/10/14 126/75   06/10/14 109/60    Pulse Readings from Last 3 Encounters:   08/19/18 68   11/08/12 80      Resp Readings from Last 3 Encounters:   08/19/18 18   06/10/14 16   06/10/14 16    SpO2 Readings from Last 3 Encounters:   08/19/18 100%   06/10/14 99%   06/10/14 96%      Temp Readings from Last 1 Encounters:   08/19/18 98.2  F (36.8  C) (Oral)    Ht Readings from Last 1 Encounters:   04/04/19 1.524 m (5')      Wt Readings from Last 1 Encounters:   04/04/19 59 kg (130 lb)    Estimated body mass index is 25.39 kg/m  as calculated from the following:    Height as of this encounter: 1.524 m (5').    Weight as of this encounter: 59 kg (130 lb).       Anesthesia Plan      History & Physical Review  History and physical reviewed and following examination; no interval change.    ASA Status:  1 .    NPO Status:  > 8 hours    Plan for MAC Reason for MAC:  Deep or markedly invasive procedure (G8)         Postoperative Care  Postoperative pain management:  IV analgesics.      Consents  Anesthetic plan, risks, benefits and alternatives discussed with:  Patient.  Use of blood products discussed: Yes.   Use of blood products discussed with Patient.  Consented to blood products.  .                 Aric Broderick MD                    .

## 2019-04-09 NOTE — OP NOTE
[facility] Operative Note  Hysteroscopy, Dilation and Curettage    Date of Surgery: 4/9/2019  Patient: Brandi Powers  MRN: [id number]    Pre-operative diagnosis: Pelvic pain  Abnormal uterine bleeding   Post-operative diagnosis: Same   Procedure: Hysteroscopy, dilation and curettage, polypectomy   Surgeon: Nelia Armijo MD   Anesthesia: MAC (monitored anesthesia care)   Estimated blood loss: 5 mL   Total IV fluids: 600ml   Blood transfusion: No transfusion was given during surgery   Total urine output: None   Drains: None   Specimens: Endometrial polyp, endometrial curetting   Findings: Endometrial polyp is visualized eminating from the posterior uterine fundus. It is somewhat irregular in appearance. The remainder of the uterus is normal and tubal ostia are visualized bilaterally.   Complications: None   Condition: Stable       Indications: Brandi oPwers is a 43 year old  who initially presented to see her PCP with complaints of pelvic pain and heavy menstrual cycles. Patient had a history of an endometrial polyp that was treated in June 2018 and these symptoms felt similar to that. That pathology report showed simple focal hyperplasia. The patient did not receive any specific treatment regarding the simple hyperplasia. Given her symptoms, a pelvic ultrasound was completed and this did show another endometrial polyp. Given this, above procedure was recommended. Risks, benefits, and alternatives were discussed and informed consent was signed on the day of surgery.    Procedure in detail: The patient was taken to the OR where MAC anesthesia was administered without difficulty. She was placed in the dorsal lithotomy position with Bubba stirrups. The patient was then prepped and draped in usual sterile fashion.    An open-sided speculum was inserted in the vagina and the cervix was brought into view. A single-toothed tenaculum was used to grasp the anterior lip of the cervix. The cervical os was  sequentially dilated to accommodate the Myosure hysteroscope using Hegar dilators. The Myosure hysteroscope was then introduced into the uterus under direct visualization and the uterus was distended with normal saline. The inside of the uterus was then visualized and findings as noted above. The Myosure was introduced through the hysteroscope and used to resect in the polyp in its entirety. It was also used to complete visual D&C circumferentially.The Myosure and the hysteroscope were then removed. A sharp curette was introduced. The uterus was curetted in all quadrants until a gritty feeling was noted in all aspects of the uterus. The endometrial polyp and scrapings were sent to pathology. The tenaculum was removed from the cervix and good hemostasis was noted at the puncture sites.     The patient tolerated the procedure well. Instrument and sponge counts were correct x 2. The patient was transported to the recovery room in stable condition.    Ashley Hieronimus, MD [facility]  4/9/2019 12:18 PM

## 2019-04-10 LAB — COPATH REPORT: NORMAL

## 2019-05-06 ENCOUNTER — HOSPITAL ENCOUNTER (EMERGENCY)
Facility: CLINIC | Age: 44
Discharge: HOME | End: 2019-05-06
Attending: EMERGENCY MEDICINE | Admitting: EMERGENCY MEDICINE
Payer: COMMERCIAL

## 2019-05-06 ENCOUNTER — APPOINTMENT (OUTPATIENT)
Dept: CT IMAGING | Facility: CLINIC | Age: 44
End: 2019-05-06
Attending: EMERGENCY MEDICINE
Payer: COMMERCIAL

## 2019-05-06 VITALS
OXYGEN SATURATION: 97 % | HEART RATE: 88 BPM | SYSTOLIC BLOOD PRESSURE: 123 MMHG | WEIGHT: 132.28 LBS | DIASTOLIC BLOOD PRESSURE: 65 MMHG | HEIGHT: 60 IN | BODY MASS INDEX: 25.97 KG/M2 | TEMPERATURE: 98.2 F | RESPIRATION RATE: 16 BRPM

## 2019-05-06 DIAGNOSIS — R11.2 NAUSEA VOMITING AND DIARRHEA: ICD-10-CM

## 2019-05-06 DIAGNOSIS — R19.7 NAUSEA VOMITING AND DIARRHEA: ICD-10-CM

## 2019-05-06 DIAGNOSIS — R10.84 GENERALIZED ABDOMINAL PAIN: ICD-10-CM

## 2019-05-06 LAB
ALBUMIN SERPL-MCNC: 3.7 G/DL (ref 3.4–5)
ALP SERPL-CCNC: 57 U/L (ref 40–150)
ALT SERPL W P-5'-P-CCNC: 18 U/L (ref 0–50)
ANION GAP SERPL CALCULATED.3IONS-SCNC: 5 MMOL/L (ref 3–14)
AST SERPL W P-5'-P-CCNC: 12 U/L (ref 0–45)
BASOPHILS # BLD AUTO: 0.1 10E9/L (ref 0–0.2)
BASOPHILS NFR BLD AUTO: 0.5 %
BILIRUB SERPL-MCNC: 0.5 MG/DL (ref 0.2–1.3)
BUN SERPL-MCNC: 20 MG/DL (ref 7–30)
CALCIUM SERPL-MCNC: 7.7 MG/DL (ref 8.5–10.1)
CHLORIDE SERPL-SCNC: 113 MMOL/L (ref 94–109)
CO2 SERPL-SCNC: 22 MMOL/L (ref 20–32)
CREAT BLD-MCNC: 0.4 MG/DL (ref 0.52–1.04)
CREAT SERPL-MCNC: 0.53 MG/DL (ref 0.52–1.04)
DIFFERENTIAL METHOD BLD: NORMAL
EOSINOPHIL # BLD AUTO: 0.2 10E9/L (ref 0–0.7)
EOSINOPHIL NFR BLD AUTO: 1.7 %
ERYTHROCYTE [DISTWIDTH] IN BLOOD BY AUTOMATED COUNT: 12.3 % (ref 10–15)
GFR SERPL CREATININE-BSD FRML MDRD: >90 ML/MIN/{1.73_M2}
GFR SERPL CREATININE-BSD FRML MDRD: >90 ML/MIN/{1.73_M2}
GLUCOSE SERPL-MCNC: 107 MG/DL (ref 70–99)
HCT VFR BLD AUTO: 40.4 % (ref 35–47)
HGB BLD-MCNC: 13.4 G/DL (ref 11.7–15.7)
IMM GRANULOCYTES # BLD: 0 10E9/L (ref 0–0.4)
IMM GRANULOCYTES NFR BLD: 0.3 %
LIPASE SERPL-CCNC: 100 U/L (ref 73–393)
LYMPHOCYTES # BLD AUTO: 1.3 10E9/L (ref 0.8–5.3)
LYMPHOCYTES NFR BLD AUTO: 13.4 %
MCH RBC QN AUTO: 30.7 PG (ref 26.5–33)
MCHC RBC AUTO-ENTMCNC: 33.2 G/DL (ref 31.5–36.5)
MCV RBC AUTO: 93 FL (ref 78–100)
MONOCYTES # BLD AUTO: 0.6 10E9/L (ref 0–1.3)
MONOCYTES NFR BLD AUTO: 6 %
NEUTROPHILS # BLD AUTO: 7.3 10E9/L (ref 1.6–8.3)
NEUTROPHILS NFR BLD AUTO: 78.1 %
NRBC # BLD AUTO: 0 10*3/UL
NRBC BLD AUTO-RTO: 0 /100
PLATELET # BLD AUTO: 229 10E9/L (ref 150–450)
POTASSIUM SERPL-SCNC: 4 MMOL/L (ref 3.4–5.3)
PROT SERPL-MCNC: 6.8 G/DL (ref 6.8–8.8)
RBC # BLD AUTO: 4.36 10E12/L (ref 3.8–5.2)
SODIUM SERPL-SCNC: 140 MMOL/L (ref 133–144)
WBC # BLD AUTO: 9.4 10E9/L (ref 4–11)

## 2019-05-06 PROCEDURE — 25000128 H RX IP 250 OP 636: Performed by: EMERGENCY MEDICINE

## 2019-05-06 PROCEDURE — 96375 TX/PRO/DX INJ NEW DRUG ADDON: CPT

## 2019-05-06 PROCEDURE — 85025 COMPLETE CBC W/AUTO DIFF WBC: CPT | Performed by: EMERGENCY MEDICINE

## 2019-05-06 PROCEDURE — 83690 ASSAY OF LIPASE: CPT | Performed by: EMERGENCY MEDICINE

## 2019-05-06 PROCEDURE — 25000125 ZZHC RX 250: Performed by: EMERGENCY MEDICINE

## 2019-05-06 PROCEDURE — 74177 CT ABD & PELVIS W/CONTRAST: CPT

## 2019-05-06 PROCEDURE — 96374 THER/PROPH/DIAG INJ IV PUSH: CPT | Mod: 59

## 2019-05-06 PROCEDURE — 80053 COMPREHEN METABOLIC PANEL: CPT | Performed by: EMERGENCY MEDICINE

## 2019-05-06 PROCEDURE — 99285 EMERGENCY DEPT VISIT HI MDM: CPT | Mod: 25

## 2019-05-06 PROCEDURE — 96361 HYDRATE IV INFUSION ADD-ON: CPT

## 2019-05-06 PROCEDURE — 82565 ASSAY OF CREATININE: CPT

## 2019-05-06 PROCEDURE — 96376 TX/PRO/DX INJ SAME DRUG ADON: CPT

## 2019-05-06 RX ORDER — ONDANSETRON 4 MG/1
4 TABLET, ORALLY DISINTEGRATING ORAL EVERY 8 HOURS PRN
Qty: 10 TABLET | Refills: 0 | Status: SHIPPED | OUTPATIENT
Start: 2019-05-06 | End: 2019-09-27

## 2019-05-06 RX ORDER — IOPAMIDOL 755 MG/ML
500 INJECTION, SOLUTION INTRAVASCULAR ONCE
Status: COMPLETED | OUTPATIENT
Start: 2019-05-06 | End: 2019-05-06

## 2019-05-06 RX ORDER — HYDROMORPHONE HYDROCHLORIDE 1 MG/ML
.5-1 INJECTION, SOLUTION INTRAMUSCULAR; INTRAVENOUS; SUBCUTANEOUS
Status: DISCONTINUED | OUTPATIENT
Start: 2019-05-06 | End: 2019-05-06 | Stop reason: HOSPADM

## 2019-05-06 RX ORDER — SODIUM CHLORIDE 9 MG/ML
1000 INJECTION, SOLUTION INTRAVENOUS CONTINUOUS
Status: DISCONTINUED | OUTPATIENT
Start: 2019-05-06 | End: 2019-05-06 | Stop reason: HOSPADM

## 2019-05-06 RX ORDER — ONDANSETRON 2 MG/ML
4 INJECTION INTRAMUSCULAR; INTRAVENOUS ONCE
Status: COMPLETED | OUTPATIENT
Start: 2019-05-06 | End: 2019-05-06

## 2019-05-06 RX ADMIN — FAMOTIDINE 20 MG: 10 INJECTION, SOLUTION INTRAVENOUS at 09:46

## 2019-05-06 RX ADMIN — Medication 0.5 MG: at 09:31

## 2019-05-06 RX ADMIN — SODIUM CHLORIDE 1000 ML: 9 INJECTION, SOLUTION INTRAVENOUS at 07:30

## 2019-05-06 RX ADMIN — ONDANSETRON 4 MG: 2 INJECTION INTRAMUSCULAR; INTRAVENOUS at 07:30

## 2019-05-06 RX ADMIN — SODIUM CHLORIDE 57 ML: 9 INJECTION, SOLUTION INTRAVENOUS at 08:02

## 2019-05-06 RX ADMIN — Medication 0.5 MG: at 08:22

## 2019-05-06 RX ADMIN — IOPAMIDOL 66 ML: 755 INJECTION, SOLUTION INTRAVENOUS at 07:54

## 2019-05-06 ASSESSMENT — ENCOUNTER SYMPTOMS
BLOOD IN STOOL: 0
DIARRHEA: 1
NAUSEA: 1
ABDOMINAL PAIN: 1
VOMITING: 0

## 2019-05-06 ASSESSMENT — MIFFLIN-ST. JEOR: SCORE: 1171.5

## 2019-05-06 NOTE — ED PROVIDER NOTES
History     Chief Complaint:  Abdominal Pain; Nausea; and Diarrhea    A  was used.      Brandi Powers is a 43 year old female who presents to the emergency department today for evaluation of abdominal pain, nausea, and diarrhea. The patient began having abdominal pain 3 nights ago, and developed nausea last night. She has not had any vomiting but has had diarrhea. She was able to eat dinner last night, but has not eaten this morning. She had an colposcopy to remove uterine polyps on April 9, denies any other abdominal surgeries, still has her appendix. No blood in her stool.    Allergies:  No Known Drug Allergies    Medications:    Medications reviewed. No pertinent medications.    Past Medical History:    Medications reviewed. No pertinent medications.    Past Surgical History:    Cystectomy    Family History:    History reviewed. No pertinent family history.    Social History:  The patient was accompanied to the ED by her .  Smoking Status: Never Smoker  Smokeless Tobacco: Never Used  Alcohol Use: Negative      Marital Status:       Review of Systems   Gastrointestinal: Positive for abdominal pain, diarrhea and nausea. Negative for blood in stool and vomiting.   All other systems reviewed and are negative.    Physical Exam     Patient Vitals for the past 24 hrs:   BP Temp Temp src Pulse Heart Rate Resp SpO2 Height Weight   05/06/19 1045 -- -- -- 88 -- -- 97 % -- --   05/06/19 1030 123/65 -- -- 81 -- -- 98 % -- --   05/06/19 1015 123/68 -- -- 80 -- -- 96 % -- --   05/06/19 1000 127/67 -- -- 93 -- -- 94 % -- --   05/06/19 0945 116/49 -- -- 76 -- -- 95 % -- --   05/06/19 0930 119/61 -- -- 71 -- -- 97 % -- --   05/06/19 0915 -- -- -- -- -- -- 100 % -- --   05/06/19 0900 114/59 -- -- 68 -- -- 98 % -- --   05/06/19 0845 111/64 -- -- 65 -- -- 97 % -- --   05/06/19 0830 114/60 -- -- 68 -- -- 97 % -- --   05/06/19 0745 -- -- -- -- -- -- 98 % -- --   05/06/19 0730 104/53 -- -- -- -- --  -- -- --   05/06/19 0715 -- -- -- -- -- -- 98 % -- --   05/06/19 0700 110/62 -- -- 74 -- -- 98 % -- --   05/06/19 0645 -- -- -- -- -- -- 98 % -- --   05/06/19 0630 113/53 -- -- -- -- -- -- -- --   05/06/19 0627 (!) 126/37 98.2  F (36.8  C) Oral -- 91 16 97 % 1.524 m (5') 60 kg (132 lb 4.4 oz)      Physical Exam  General: Patient is alert and interactive when I enter the room  Head:  The scalp, face, and head appear normal  Eyes:  Conjunctivae are normal  ENT:    The nose is normal    Pinnae are normal    External acoustic canals are normal  Neck:  Trachea midline  CV:  Pulses are normal.   Resp:  No respiratory distress   Musc:  Normal muscular tone    No major joint effusions    No asymmetric leg swelling    Good capillary refill noted  GI  Epigastric tenderness. Hyperactive bowel sounds, not high pitched.     No rebound or guarding.    2nd exam: Minimal tenderness to palpation.  Skin:  No rash or lesions noted  Neuro: Speech is normal and fluent. Face is symmetric.     Moving all extremities well.   Psych:  Awake. Alert.  Normal affect.  Appropriate interactions.    Emergency Department Course     Imaging:  Radiology findings were communicated with the patient who voiced understanding of the findings.    CT Abdomen Pelvis w Contrast  1. No acute abnormality in the abdomen or pelvis. No cause for  abdominal pain is identified.  2. Few small probable uterine fibroids are identified. Consider pelvic  ultrasound for further characterization.  Reading per radiology    Laboratory:  Laboratory findings were communicated with the patient who voiced understanding of the findings.    Creatinine POCT: 0.4  Lipase: 100  CBC: WBC 9.4, HGB 13.4,   CMP: Chloride 113, Glucose 107, Calcium 7.7, o/w WNL (Creatinine 0.53)    Interventions:  0730 NS, 1 L, IV  0730 Zofran 4 mg IV  0825 NS, 1 L, IV  0946 Pepcid 20 mg IV    Emergency Department Course:    0650 Nursing notes and vitals reviewed.    0655 I performed an exam of the  patient as documented above.     0722 IV was inserted and blood was drawn for laboratory testing, results above.     0753 The patient was sent for a CT while in the emergency department, results above.      0930 Patient was rechecked and updated. She is still having pain, but her second exam is benign with minimal tenderness to palpation.    1110 Patient was rechecked and updated. Pain is minimal. I personally reviewed the imaging and lab results with the patient and answered all related questions prior to discharge.    Impression & Plan      Medical Decision Making:  Brandi Powers is a 43 year old female who presents for evaluation of nausea and diarrhea with a nonfocal abdominal exam.  The patient has not had any blood in the emesis or stool. The differential diagnosis of vomiting and diarrhea is broad and includes such etiologies as viral gastroenteritis, bacterial infection of the large intestine (salmonella, shigella, campylobacter, e coli, etc), bowel obstruction, intra-abdominal infection such as colitis, cholecystitis, UTI, pyelonephritis, appendicitis, etc.  There are no signs of worrisome intra-abdominal pathologies detected during the visit today.    The patient has a completely benign abdominal exam without rebound, guarding, or marked tenderness to palpation.  Laboratory studies were unremarkable.  The patient improved with iv fluids and anti-emetics. Supportive outpatient management is therefore indicated.  Abdominal pain precautions are given for home.  No indication for stool studies at this time.  CT was negative for any acute abnormalities.  It was discussed with the patient to return to the ED for blood in stool, increasing pain, or fevers more than 102.   Feels much improved after interventions in ED.     Diagnosis:    ICD-10-CM    1. Nausea vomiting and diarrhea R11.2     R19.7    2. Generalized abdominal pain R10.84      Disposition:   Discharge    Discharge Medications:  New Prescriptions     ONDANSETRON (ZOFRAN ODT) 4 MG ODT TAB    Take 1 tablet (4 mg) by mouth every 8 hours as needed for nausea    RANITIDINE (ZANTAC) 150 MG TABLET    Take 1 tablet (150 mg) by mouth 2 times daily for 10 days     Scribe Disclosure:  Tommy GREENWOOD, am serving as a scribe at 6:55 AM on 5/6/2019 to document services personally performed by Trevor Pineda MD based on my observations and the provider's statements to me.    [facility] EMERGENCY DEPARTMENT       Trevor Pineda MD  05/06/19 6068

## 2019-05-06 NOTE — ED AVS SNAPSHOT
[facility] Emergency Department  [address]  [facility] [address]  Phone:  [phone]  Fax:  [phone]                                    Brandi Powers   MRN: [id number]    Department:  [facility] Emergency Department   Date of Visit:  5/6/2019           After Visit Summary Signature Page    I have received my discharge instructions, and my questions have been answered. I have discussed any challenges I see with this plan with the nurse or doctor.    ..........................................................................................................................................  Patient/Patient Representative Signature      ..........................................................................................................................................  Patient Representative Print Name and Relationship to Patient    ..................................................               ................................................  Date                                   Time    ..........................................................................................................................................  Reviewed by Signature/Title    ...................................................              ..............................................  Date                                               Time          22EPIC Rev 08/18

## 2019-05-14 ENCOUNTER — HOSPITAL ENCOUNTER (OUTPATIENT)
Facility: CLINIC | Age: 44
End: 2019-05-14
Attending: OBSTETRICS & GYNECOLOGY | Admitting: OBSTETRICS & GYNECOLOGY
Payer: COMMERCIAL

## 2019-06-02 RX ORDER — CEFAZOLIN SODIUM 2 G/100ML
2 INJECTION, SOLUTION INTRAVENOUS
Status: CANCELLED | OUTPATIENT
Start: 2019-06-02

## 2019-06-02 RX ORDER — CEFAZOLIN SODIUM 1 G/3ML
1 INJECTION, POWDER, FOR SOLUTION INTRAMUSCULAR; INTRAVENOUS SEE ADMIN INSTRUCTIONS
Status: CANCELLED | OUTPATIENT
Start: 2019-06-02

## 2019-06-02 RX ORDER — PHENAZOPYRIDINE HYDROCHLORIDE 200 MG/1
200 TABLET, FILM COATED ORAL ONCE
Status: CANCELLED | OUTPATIENT
Start: 2019-06-02

## 2019-06-02 RX ORDER — HEPARIN SODIUM 5000 [USP'U]/.5ML
5000 INJECTION, SOLUTION INTRAVENOUS; SUBCUTANEOUS
Status: CANCELLED | OUTPATIENT
Start: 2019-06-02

## 2019-06-02 RX ORDER — PHENAZOPYRIDINE HYDROCHLORIDE 200 MG/1
200 TABLET, FILM COATED ORAL ONCE
Status: CANCELLED | OUTPATIENT
Start: 2019-06-02 | End: 2019-06-02

## 2019-07-09 VITALS — WEIGHT: 130 LBS | HEIGHT: 60 IN | BODY MASS INDEX: 25.52 KG/M2

## 2019-07-09 ASSESSMENT — MIFFLIN-ST. JEOR: SCORE: 1156.18

## 2019-10-07 ENCOUNTER — ANESTHESIA EVENT (OUTPATIENT)
Dept: SURGERY | Facility: CLINIC | Age: 44
End: 2019-10-07
Payer: COMMERCIAL

## 2019-10-07 ENCOUNTER — HOSPITAL ENCOUNTER (OUTPATIENT)
Facility: CLINIC | Age: 44
Discharge: HOME | End: 2019-10-07
Attending: OBSTETRICS & GYNECOLOGY | Admitting: OBSTETRICS & GYNECOLOGY
Payer: COMMERCIAL

## 2019-10-07 ENCOUNTER — APPOINTMENT (OUTPATIENT)
Dept: GENERAL RADIOLOGY | Facility: CLINIC | Age: 44
End: 2019-10-07
Attending: OBSTETRICS & GYNECOLOGY
Payer: COMMERCIAL

## 2019-10-07 ENCOUNTER — ANESTHESIA (OUTPATIENT)
Dept: SURGERY | Facility: CLINIC | Age: 44
End: 2019-10-07
Payer: COMMERCIAL

## 2019-10-07 VITALS
SYSTOLIC BLOOD PRESSURE: 122 MMHG | TEMPERATURE: 98.8 F | OXYGEN SATURATION: 99 % | WEIGHT: 135 LBS | DIASTOLIC BLOOD PRESSURE: 62 MMHG | HEART RATE: 65 BPM | BODY MASS INDEX: 26.37 KG/M2 | RESPIRATION RATE: 15 BRPM

## 2019-10-07 DIAGNOSIS — Z90.710 S/P VAGINAL HYSTERECTOMY: Primary | ICD-10-CM

## 2019-10-07 LAB
HCG UR QL: NEGATIVE
HGB BLD-MCNC: 10.7 G/DL (ref 11.7–15.7)

## 2019-10-07 PROCEDURE — 36000058 ZZH SURGERY LEVEL 3 EA 15 ADDTL MIN: Performed by: OBSTETRICS & GYNECOLOGY

## 2019-10-07 PROCEDURE — 27210794 ZZH OR GENERAL SUPPLY STERILE: Performed by: OBSTETRICS & GYNECOLOGY

## 2019-10-07 PROCEDURE — 88307 TISSUE EXAM BY PATHOLOGIST: CPT | Mod: 26 | Performed by: OBSTETRICS & GYNECOLOGY

## 2019-10-07 PROCEDURE — 25000128 H RX IP 250 OP 636: Performed by: ANESTHESIOLOGY

## 2019-10-07 PROCEDURE — 25000132 ZZH RX MED GY IP 250 OP 250 PS 637: Performed by: OBSTETRICS & GYNECOLOGY

## 2019-10-07 PROCEDURE — 71000013 ZZH RECOVERY PHASE 1 LEVEL 1 EA ADDTL HR: Performed by: OBSTETRICS & GYNECOLOGY

## 2019-10-07 PROCEDURE — 25800030 ZZH RX IP 258 OP 636: Performed by: ANESTHESIOLOGY

## 2019-10-07 PROCEDURE — 88307 TISSUE EXAM BY PATHOLOGIST: CPT | Performed by: OBSTETRICS & GYNECOLOGY

## 2019-10-07 PROCEDURE — 25000128 H RX IP 250 OP 636: Performed by: OBSTETRICS & GYNECOLOGY

## 2019-10-07 PROCEDURE — 40000277 XR SURGERY CARM FLUORO LESS THAN 5 MIN W STILLS: Mod: TC

## 2019-10-07 PROCEDURE — 25000125 ZZHC RX 250: Performed by: NURSE ANESTHETIST, CERTIFIED REGISTERED

## 2019-10-07 PROCEDURE — 85018 HEMOGLOBIN: CPT | Performed by: OBSTETRICS & GYNECOLOGY

## 2019-10-07 PROCEDURE — 25500064 ZZH RX 255 OP 636: Performed by: OBSTETRICS & GYNECOLOGY

## 2019-10-07 PROCEDURE — 71000012 ZZH RECOVERY PHASE 1 LEVEL 1 FIRST HR: Performed by: OBSTETRICS & GYNECOLOGY

## 2019-10-07 PROCEDURE — 40000306 ZZH STATISTIC PRE PROC ASSESS II: Performed by: OBSTETRICS & GYNECOLOGY

## 2019-10-07 PROCEDURE — 25000125 ZZHC RX 250: Performed by: ANESTHESIOLOGY

## 2019-10-07 PROCEDURE — 81025 URINE PREGNANCY TEST: CPT | Performed by: OBSTETRICS & GYNECOLOGY

## 2019-10-07 PROCEDURE — 37000008 ZZH ANESTHESIA TECHNICAL FEE, 1ST 30 MIN: Performed by: OBSTETRICS & GYNECOLOGY

## 2019-10-07 PROCEDURE — 25000128 H RX IP 250 OP 636: Performed by: NURSE ANESTHETIST, CERTIFIED REGISTERED

## 2019-10-07 PROCEDURE — 25000132 ZZH RX MED GY IP 250 OP 250 PS 637: Performed by: ANESTHESIOLOGY

## 2019-10-07 PROCEDURE — 36000060 ZZH SURGERY LEVEL 3 W FLUORO 1ST 30 MIN: Performed by: OBSTETRICS & GYNECOLOGY

## 2019-10-07 PROCEDURE — 25000125 ZZHC RX 250: Performed by: OBSTETRICS & GYNECOLOGY

## 2019-10-07 PROCEDURE — 25800030 ZZH RX IP 258 OP 636: Performed by: NURSE ANESTHETIST, CERTIFIED REGISTERED

## 2019-10-07 PROCEDURE — 36415 COLL VENOUS BLD VENIPUNCTURE: CPT | Performed by: OBSTETRICS & GYNECOLOGY

## 2019-10-07 PROCEDURE — 71000027 ZZH RECOVERY PHASE 2 EACH 15 MINS: Performed by: OBSTETRICS & GYNECOLOGY

## 2019-10-07 PROCEDURE — 37000009 ZZH ANESTHESIA TECHNICAL FEE, EACH ADDTL 15 MIN: Performed by: OBSTETRICS & GYNECOLOGY

## 2019-10-07 PROCEDURE — C1769 GUIDE WIRE: HCPCS | Performed by: OBSTETRICS & GYNECOLOGY

## 2019-10-07 RX ORDER — LIDOCAINE 40 MG/G
CREAM TOPICAL
Status: DISCONTINUED | OUTPATIENT
Start: 2019-10-07 | End: 2019-10-07 | Stop reason: HOSPADM

## 2019-10-07 RX ORDER — EPHEDRINE SULFATE 50 MG/ML
25 INJECTION, SOLUTION INTRAVENOUS ONCE
Status: COMPLETED | OUTPATIENT
Start: 2019-10-07 | End: 2019-10-07

## 2019-10-07 RX ORDER — LIDOCAINE HYDROCHLORIDE AND EPINEPHRINE 10; 10 MG/ML; UG/ML
INJECTION, SOLUTION INFILTRATION; PERINEURAL PRN
Status: DISCONTINUED | OUTPATIENT
Start: 2019-10-07 | End: 2019-10-07 | Stop reason: HOSPADM

## 2019-10-07 RX ORDER — ALBUTEROL SULFATE 0.83 MG/ML
2.5 SOLUTION RESPIRATORY (INHALATION) EVERY 4 HOURS PRN
Status: DISCONTINUED | OUTPATIENT
Start: 2019-10-07 | End: 2019-10-07 | Stop reason: HOSPADM

## 2019-10-07 RX ORDER — HYDROXYZINE HYDROCHLORIDE 50 MG/1
50 TABLET, FILM COATED ORAL EVERY 6 HOURS PRN
Qty: 30 TABLET | Refills: 0 | Status: ON HOLD | OUTPATIENT
Start: 2019-10-07 | End: 2022-01-14

## 2019-10-07 RX ORDER — IBUPROFEN 800 MG/1
800 TABLET, FILM COATED ORAL EVERY 8 HOURS PRN
Qty: 30 TABLET | Refills: 1 | Status: ON HOLD | OUTPATIENT
Start: 2019-10-07 | End: 2022-01-14

## 2019-10-07 RX ORDER — FENTANYL CITRATE 50 UG/ML
25-50 INJECTION, SOLUTION INTRAMUSCULAR; INTRAVENOUS EVERY 5 MIN PRN
Status: DISCONTINUED | OUTPATIENT
Start: 2019-10-07 | End: 2019-10-07 | Stop reason: HOSPADM

## 2019-10-07 RX ORDER — OXYCODONE AND ACETAMINOPHEN 5; 325 MG/1; MG/1
1 TABLET ORAL
Status: DISCONTINUED | OUTPATIENT
Start: 2019-10-07 | End: 2019-10-07 | Stop reason: HOSPADM

## 2019-10-07 RX ORDER — HYDROXYZINE HYDROCHLORIDE 50 MG/1
50 TABLET, FILM COATED ORAL 3 TIMES DAILY PRN
Status: CANCELLED | OUTPATIENT
Start: 2019-10-07

## 2019-10-07 RX ORDER — HYDROMORPHONE HYDROCHLORIDE 1 MG/ML
0.2 INJECTION, SOLUTION INTRAMUSCULAR; INTRAVENOUS; SUBCUTANEOUS
Status: DISCONTINUED | OUTPATIENT
Start: 2019-10-07 | End: 2019-10-07 | Stop reason: HOSPADM

## 2019-10-07 RX ORDER — SODIUM CHLORIDE, SODIUM LACTATE, POTASSIUM CHLORIDE, CALCIUM CHLORIDE 600; 310; 30; 20 MG/100ML; MG/100ML; MG/100ML; MG/100ML
INJECTION, SOLUTION INTRAVENOUS CONTINUOUS PRN
Status: DISCONTINUED | OUTPATIENT
Start: 2019-10-07 | End: 2019-10-07

## 2019-10-07 RX ORDER — SODIUM CHLORIDE, SODIUM LACTATE, POTASSIUM CHLORIDE, CALCIUM CHLORIDE 600; 310; 30; 20 MG/100ML; MG/100ML; MG/100ML; MG/100ML
INJECTION, SOLUTION INTRAVENOUS CONTINUOUS
Status: DISCONTINUED | OUTPATIENT
Start: 2019-10-07 | End: 2019-10-07 | Stop reason: HOSPADM

## 2019-10-07 RX ORDER — FENTANYL CITRATE 50 UG/ML
INJECTION, SOLUTION INTRAMUSCULAR; INTRAVENOUS PRN
Status: DISCONTINUED | OUTPATIENT
Start: 2019-10-07 | End: 2019-10-07

## 2019-10-07 RX ORDER — FUROSEMIDE 10 MG/ML
INJECTION INTRAMUSCULAR; INTRAVENOUS PRN
Status: DISCONTINUED | OUTPATIENT
Start: 2019-10-07 | End: 2019-10-07

## 2019-10-07 RX ORDER — MEPERIDINE HYDROCHLORIDE 50 MG/ML
12.5 INJECTION INTRAMUSCULAR; INTRAVENOUS; SUBCUTANEOUS
Status: DISCONTINUED | OUTPATIENT
Start: 2019-10-07 | End: 2019-10-07 | Stop reason: HOSPADM

## 2019-10-07 RX ORDER — GABAPENTIN 300 MG/1
300 CAPSULE ORAL ONCE
Status: COMPLETED | OUTPATIENT
Start: 2019-10-07 | End: 2019-10-07

## 2019-10-07 RX ORDER — AMOXICILLIN 250 MG
1-2 CAPSULE ORAL 2 TIMES DAILY
Qty: 60 TABLET | Refills: 1 | Status: ON HOLD | OUTPATIENT
Start: 2019-10-07 | End: 2022-01-14

## 2019-10-07 RX ORDER — HEPARIN SODIUM 5000 [USP'U]/.5ML
5000 INJECTION, SOLUTION INTRAVENOUS; SUBCUTANEOUS
Status: COMPLETED | OUTPATIENT
Start: 2019-10-07 | End: 2019-10-07

## 2019-10-07 RX ORDER — CEFAZOLIN SODIUM 2 G/100ML
2 INJECTION, SOLUTION INTRAVENOUS
Status: COMPLETED | OUTPATIENT
Start: 2019-10-07 | End: 2019-10-07

## 2019-10-07 RX ORDER — GLYCOPYRROLATE 0.2 MG/ML
INJECTION, SOLUTION INTRAMUSCULAR; INTRAVENOUS PRN
Status: DISCONTINUED | OUTPATIENT
Start: 2019-10-07 | End: 2019-10-07

## 2019-10-07 RX ORDER — PHENAZOPYRIDINE HYDROCHLORIDE 200 MG/1
200 TABLET, FILM COATED ORAL
Status: COMPLETED | OUTPATIENT
Start: 2019-10-07 | End: 2019-10-07

## 2019-10-07 RX ORDER — NALOXONE HYDROCHLORIDE 0.4 MG/ML
.1-.4 INJECTION, SOLUTION INTRAMUSCULAR; INTRAVENOUS; SUBCUTANEOUS
Status: DISCONTINUED | OUTPATIENT
Start: 2019-10-07 | End: 2019-10-07 | Stop reason: HOSPADM

## 2019-10-07 RX ORDER — NEOSTIGMINE METHYLSULFATE 1 MG/ML
VIAL (ML) INJECTION PRN
Status: DISCONTINUED | OUTPATIENT
Start: 2019-10-07 | End: 2019-10-07

## 2019-10-07 RX ORDER — OXYCODONE AND ACETAMINOPHEN 5; 325 MG/1; MG/1
1 TABLET ORAL EVERY 6 HOURS PRN
Qty: 12 TABLET | Refills: 0 | Status: SHIPPED | OUTPATIENT
Start: 2019-10-07 | End: 2019-10-10

## 2019-10-07 RX ORDER — IBUPROFEN 600 MG/1
600 TABLET, FILM COATED ORAL
Status: DISCONTINUED | OUTPATIENT
Start: 2019-10-07 | End: 2019-10-07

## 2019-10-07 RX ORDER — ONDANSETRON 4 MG/1
4 TABLET, ORALLY DISINTEGRATING ORAL EVERY 30 MIN PRN
Status: DISCONTINUED | OUTPATIENT
Start: 2019-10-07 | End: 2019-10-07 | Stop reason: HOSPADM

## 2019-10-07 RX ORDER — HYDROMORPHONE HYDROCHLORIDE 1 MG/ML
.3-.5 INJECTION, SOLUTION INTRAMUSCULAR; INTRAVENOUS; SUBCUTANEOUS EVERY 10 MIN PRN
Status: DISCONTINUED | OUTPATIENT
Start: 2019-10-07 | End: 2019-10-07 | Stop reason: HOSPADM

## 2019-10-07 RX ORDER — DEXAMETHASONE SODIUM PHOSPHATE 4 MG/ML
INJECTION, SOLUTION INTRA-ARTICULAR; INTRALESIONAL; INTRAMUSCULAR; INTRAVENOUS; SOFT TISSUE PRN
Status: DISCONTINUED | OUTPATIENT
Start: 2019-10-07 | End: 2019-10-07

## 2019-10-07 RX ORDER — KETOROLAC TROMETHAMINE 30 MG/ML
INJECTION, SOLUTION INTRAMUSCULAR; INTRAVENOUS PRN
Status: DISCONTINUED | OUTPATIENT
Start: 2019-10-07 | End: 2019-10-07

## 2019-10-07 RX ORDER — CEFAZOLIN SODIUM 1 G/3ML
1 INJECTION, POWDER, FOR SOLUTION INTRAMUSCULAR; INTRAVENOUS SEE ADMIN INSTRUCTIONS
Status: DISCONTINUED | OUTPATIENT
Start: 2019-10-07 | End: 2019-10-07 | Stop reason: HOSPADM

## 2019-10-07 RX ORDER — ONDANSETRON 4 MG/1
4 TABLET, ORALLY DISINTEGRATING ORAL EVERY 6 HOURS PRN
Status: DISCONTINUED | OUTPATIENT
Start: 2019-10-07 | End: 2019-10-07 | Stop reason: HOSPADM

## 2019-10-07 RX ORDER — PROPOFOL 10 MG/ML
INJECTION, EMULSION INTRAVENOUS PRN
Status: DISCONTINUED | OUTPATIENT
Start: 2019-10-07 | End: 2019-10-07

## 2019-10-07 RX ORDER — ONDANSETRON 4 MG/1
4 TABLET, ORALLY DISINTEGRATING ORAL EVERY 8 HOURS PRN
Qty: 30 TABLET | Refills: 0 | Status: ON HOLD | OUTPATIENT
Start: 2019-10-07 | End: 2022-01-14

## 2019-10-07 RX ORDER — ONDANSETRON 2 MG/ML
4 INJECTION INTRAMUSCULAR; INTRAVENOUS EVERY 6 HOURS PRN
Status: DISCONTINUED | OUTPATIENT
Start: 2019-10-07 | End: 2019-10-07 | Stop reason: HOSPADM

## 2019-10-07 RX ORDER — ONDANSETRON 2 MG/ML
4 INJECTION INTRAMUSCULAR; INTRAVENOUS EVERY 30 MIN PRN
Status: DISCONTINUED | OUTPATIENT
Start: 2019-10-07 | End: 2019-10-07 | Stop reason: HOSPADM

## 2019-10-07 RX ORDER — IBUPROFEN 600 MG/1
600 TABLET, FILM COATED ORAL
Status: DISCONTINUED | OUTPATIENT
Start: 2019-10-07 | End: 2019-10-07 | Stop reason: HOSPADM

## 2019-10-07 RX ORDER — ACETAMINOPHEN 325 MG/1
975 TABLET ORAL ONCE
Status: COMPLETED | OUTPATIENT
Start: 2019-10-07 | End: 2019-10-07

## 2019-10-07 RX ORDER — PROMETHAZINE HYDROCHLORIDE 25 MG/ML
25 INJECTION INTRAMUSCULAR; INTRAVENOUS ONCE
Status: COMPLETED | OUTPATIENT
Start: 2019-10-07 | End: 2019-10-07

## 2019-10-07 RX ORDER — ONDANSETRON 2 MG/ML
INJECTION INTRAMUSCULAR; INTRAVENOUS PRN
Status: DISCONTINUED | OUTPATIENT
Start: 2019-10-07 | End: 2019-10-07

## 2019-10-07 RX ORDER — FENTANYL CITRATE 50 UG/ML
25-50 INJECTION, SOLUTION INTRAMUSCULAR; INTRAVENOUS
Status: DISCONTINUED | OUTPATIENT
Start: 2019-10-07 | End: 2019-10-07 | Stop reason: HOSPADM

## 2019-10-07 RX ORDER — HYDROCODONE BITARTRATE AND ACETAMINOPHEN 5; 325 MG/1; MG/1
1 TABLET ORAL
Status: COMPLETED | OUTPATIENT
Start: 2019-10-07 | End: 2019-10-07

## 2019-10-07 RX ORDER — LIDOCAINE HYDROCHLORIDE 10 MG/ML
INJECTION, SOLUTION INFILTRATION; PERINEURAL PRN
Status: DISCONTINUED | OUTPATIENT
Start: 2019-10-07 | End: 2019-10-07

## 2019-10-07 RX ORDER — EPHEDRINE SULFATE 50 MG/ML
INJECTION, SOLUTION INTRAMUSCULAR; INTRAVENOUS; SUBCUTANEOUS PRN
Status: DISCONTINUED | OUTPATIENT
Start: 2019-10-07 | End: 2019-10-07

## 2019-10-07 RX ADMIN — CEFAZOLIN SODIUM 1 G: 2 INJECTION, SOLUTION INTRAVENOUS at 09:58

## 2019-10-07 RX ADMIN — FENTANYL CITRATE 50 MCG: 50 INJECTION INTRAMUSCULAR; INTRAVENOUS at 14:22

## 2019-10-07 RX ADMIN — Medication 5 MG: at 08:05

## 2019-10-07 RX ADMIN — Medication 3 MG: at 12:12

## 2019-10-07 RX ADMIN — SODIUM CHLORIDE, POTASSIUM CHLORIDE, SODIUM LACTATE AND CALCIUM CHLORIDE: 600; 310; 30; 20 INJECTION, SOLUTION INTRAVENOUS at 10:58

## 2019-10-07 RX ADMIN — ROCURONIUM BROMIDE 50 MG: 10 INJECTION INTRAVENOUS at 07:54

## 2019-10-07 RX ADMIN — CEFAZOLIN SODIUM 2 G: 2 INJECTION, SOLUTION INTRAVENOUS at 07:58

## 2019-10-07 RX ADMIN — CEFAZOLIN SODIUM 1 G: 2 INJECTION, SOLUTION INTRAVENOUS at 11:58

## 2019-10-07 RX ADMIN — SODIUM CHLORIDE, POTASSIUM CHLORIDE, SODIUM LACTATE AND CALCIUM CHLORIDE: 600; 310; 30; 20 INJECTION, SOLUTION INTRAVENOUS at 07:48

## 2019-10-07 RX ADMIN — MIDAZOLAM 2 MG: 1 INJECTION INTRAMUSCULAR; INTRAVENOUS at 07:49

## 2019-10-07 RX ADMIN — DEXAMETHASONE SODIUM PHOSPHATE 4 MG: 4 INJECTION, SOLUTION INTRA-ARTICULAR; INTRALESIONAL; INTRAMUSCULAR; INTRAVENOUS; SOFT TISSUE at 07:54

## 2019-10-07 RX ADMIN — HYDROMORPHONE HYDROCHLORIDE 0.5 MG: 1 INJECTION, SOLUTION INTRAMUSCULAR; INTRAVENOUS; SUBCUTANEOUS at 08:56

## 2019-10-07 RX ADMIN — FUROSEMIDE 5 MG: 10 INJECTION, SOLUTION INTRAVENOUS at 10:56

## 2019-10-07 RX ADMIN — ONDANSETRON HYDROCHLORIDE 4 MG: 2 INJECTION, SOLUTION INTRAMUSCULAR; INTRAVENOUS at 14:08

## 2019-10-07 RX ADMIN — SODIUM CHLORIDE, POTASSIUM CHLORIDE, SODIUM LACTATE AND CALCIUM CHLORIDE: 600; 310; 30; 20 INJECTION, SOLUTION INTRAVENOUS at 14:00

## 2019-10-07 RX ADMIN — PROPOFOL 150 MG: 10 INJECTION, EMULSION INTRAVENOUS at 07:53

## 2019-10-07 RX ADMIN — Medication 5 MG: at 08:27

## 2019-10-07 RX ADMIN — HEPARIN SODIUM 5000 UNITS: 5000 INJECTION, SOLUTION INTRAVENOUS; SUBCUTANEOUS at 07:32

## 2019-10-07 RX ADMIN — FENTANYL CITRATE 50 MCG: 50 INJECTION INTRAMUSCULAR; INTRAVENOUS at 13:05

## 2019-10-07 RX ADMIN — PROCHLORPERAZINE EDISYLATE 10 MG: 5 INJECTION, SOLUTION INTRAMUSCULAR; INTRAVENOUS at 15:10

## 2019-10-07 RX ADMIN — SODIUM CHLORIDE, POTASSIUM CHLORIDE, SODIUM LACTATE AND CALCIUM CHLORIDE: 600; 310; 30; 20 INJECTION, SOLUTION INTRAVENOUS at 09:05

## 2019-10-07 RX ADMIN — EPHEDRINE SULFATE 25 MG: 50 INJECTION, SOLUTION INTRAVENOUS at 16:59

## 2019-10-07 RX ADMIN — PROPOFOL 50 MG: 10 INJECTION, EMULSION INTRAVENOUS at 09:26

## 2019-10-07 RX ADMIN — LIDOCAINE HYDROCHLORIDE 50 MG: 10 INJECTION, SOLUTION INFILTRATION; PERINEURAL at 07:53

## 2019-10-07 RX ADMIN — ACETAMINOPHEN 975 MG: 325 TABLET, FILM COATED ORAL at 07:30

## 2019-10-07 RX ADMIN — FENTANYL CITRATE 100 MCG: 50 INJECTION, SOLUTION INTRAMUSCULAR; INTRAVENOUS at 07:53

## 2019-10-07 RX ADMIN — HYDROCODONE BITARTRATE AND ACETAMINOPHEN 1 TABLET: 5; 325 TABLET ORAL at 14:52

## 2019-10-07 RX ADMIN — PHENAZOPYRIDINE 200 MG: 200 TABLET ORAL at 07:31

## 2019-10-07 RX ADMIN — PROMETHAZINE HYDROCHLORIDE 25 MG: 25 INJECTION INTRAMUSCULAR; INTRAVENOUS at 16:58

## 2019-10-07 RX ADMIN — ONDANSETRON HYDROCHLORIDE 4 MG: 2 INJECTION, SOLUTION INTRAVENOUS at 09:51

## 2019-10-07 RX ADMIN — KETOROLAC TROMETHAMINE 30 MG: 30 INJECTION, SOLUTION INTRAMUSCULAR at 12:15

## 2019-10-07 RX ADMIN — GLYCOPYRROLATE 0.4 MG: 0.2 INJECTION, SOLUTION INTRAMUSCULAR; INTRAVENOUS at 12:12

## 2019-10-07 RX ADMIN — GABAPENTIN 300 MG: 300 CAPSULE ORAL at 07:31

## 2019-10-07 RX ADMIN — HYDROMORPHONE HYDROCHLORIDE 0.5 MG: 1 INJECTION, SOLUTION INTRAMUSCULAR; INTRAVENOUS; SUBCUTANEOUS at 08:19

## 2019-10-07 RX ADMIN — FENTANYL CITRATE 50 MCG: 50 INJECTION INTRAMUSCULAR; INTRAVENOUS at 13:56

## 2019-10-07 RX ADMIN — SODIUM CHLORIDE, POTASSIUM CHLORIDE, SODIUM LACTATE AND CALCIUM CHLORIDE: 600; 310; 30; 20 INJECTION, SOLUTION INTRAVENOUS at 16:53

## 2019-10-07 RX ADMIN — FENTANYL CITRATE 50 MCG: 50 INJECTION INTRAMUSCULAR; INTRAVENOUS at 12:42

## 2019-10-07 ASSESSMENT — ENCOUNTER SYMPTOMS: DYSRHYTHMIAS: 0

## 2019-10-07 NOTE — ANESTHESIA POSTPROCEDURE EVALUATION
Patient: Brandi Powers    Procedure(s):  Total vaginal hysterectomy, partial left salpingectomy, cystoscopy  Cystoscopy, retrogrades, combined    Diagnosis:recurrent endometrial polyps, simple hyperplasia, pain  Diagnosis Additional Information: No value filed.    Anesthesia Type:  General, ETT    Note:  Anesthesia Post Evaluation    Patient location during evaluation: PACU  Patient participation: Able to fully participate in evaluation  Level of consciousness: sleepy but conscious, responsive to light touch and responsive to verbal stimuli  Pain management: adequate  Airway patency: patent  Cardiovascular status: acceptable  Respiratory status: acceptable  Hydration status: acceptable  PONV: controlled             Last vitals:  Vitals:    10/07/19 0704   BP: 119/85   Resp: 16   Temp: 97.2  F (36.2  C)   SpO2: 99%         Electronically Signed By: Steve Ocasio MD  October 7, 2019  12:25 PM

## 2019-10-07 NOTE — DISCHARGE INSTRUCTIONS
GENERAL ANESTHESIA OR SEDATION ADULT DISCHARGE INSTRUCTIONS   SPECIAL PRECAUTIONS FOR 24 HOURS AFTER SURGERY    IT IS NOT UNUSUAL TO FEEL LIGHT-HEADED OR FAINT, UP TO 24 HOURS AFTER SURGERY OR WHILE TAKING PAIN MEDICATION.  IF YOU HAVE THESE SYMPTOMS; SIT FOR A FEW MINUTES BEFORE STANDING AND HAVE SOMEONE ASSIST YOU WHEN YOU GET UP TO WALK OR USE THE BATHROOM.    YOU SHOULD REST AND RELAX FOR THE NEXT 24 HOURS AND YOU MUST MAKE ARRANGEMENTS TO HAVE SOMEONE STAY WITH YOU FOR AT LEAST 24 HOURS AFTER YOUR DISCHARGE.  AVOID HAZARDOUS AND STRENUOUS ACTIVITIES.  DO NOT MAKE IMPORTANT DECISIONS FOR 24 HOURS.    DO NOT DRIVE ANY VEHICLE OR OPERATE MECHANICAL EQUIPMENT FOR 24 HOURS FOLLOWING THE END OF YOUR SURGERY.  EVEN THOUGH YOU MAY FEEL NORMAL, YOUR REACTIONS MAY BE AFFECTED BY THE MEDICATION YOU HAVE RECEIVED.    DO NOT DRINK ALCOHOLIC BEVERAGES FOR 24 HOURS FOLLOWING YOUR SURGERY.    DRINK CLEAR LIQUIDS (APPLE JUICE, GINGER ALE, 7-UP, BROTH, ETC.).  PROGRESS TO YOUR REGULAR DIET AS YOU FEEL ABLE.    YOU MAY HAVE A DRY MOUTH, A SORE THROAT, MUSCLES ACHES OR TROUBLE SLEEPING.  THESE SHOULD GO AWAY AFTER 24 HOURS.    CALL YOUR DOCTOR FOR ANY OF THE FOLLOWING:  SIGNS OF INFECTION (FEVER, GROWING TENDERNESS AT THE SURGERY SITE, A LARGE AMOUNT OF DRAINAGE OR BLEEDING, SEVERE PAIN, FOUL-SMELLING DRAINAGE, REDNESS OR SWELLING.    IT HAS BEEN OVER 8 TO 10 HOURS SINCE SURGERY AND YOU ARE STILL NOT ABLE TO URINATE (PASS WATER).     DR. TIFFANY WILLIAM M.D.     CLINIC PHONE NUMBER:  [phone]  [facility]      La dosis maxima es de 4,000mg por reji.  Ya recivido 975 mg a 7:30 AM  Maximum acetaminophen (Tylenol) dose from all sources should not exceed 4 grams (4000 mg) per day.  Tylenol 975 mgs given at 0730    Usted recivio Toradol, an intrevenosa forma de ibuprofeno at 12:15.  No marilee ninguna medico tengan Ibuprofeno hasta 6:15PM.  You received Toradol, an IV form of ibuprofen (Motrin) at 1215.  Do not take any  ibuprofen products until 1815.

## 2019-10-07 NOTE — PROGRESS NOTES
Pt complaining of pain R arm. Dr bailey aware. Due to location of pain, most probably due to BP cuff. Ice and iv fent given.

## 2019-10-07 NOTE — PROGRESS NOTES
Tajik interpretor present in Phase II to complete discharge instructions with patient and her spouse.  Questions answered.

## 2019-10-07 NOTE — OP NOTE
Procedure Date: 10/07/2019      SURGEON:  Tee Moe MD      PREOPERATIVE DIAGNOSIS:  Concern for ureteral injury.      POSTOPERATIVE DIAGNOSIS:  No ureteral injury.      PROCEDURE PERFORMED:     1.  Cystoscopy.   2.  Bilateral retrograde pyelograms.   3.  Interpretation of fluoroscopic images.      ANESTHESIA:  General.      COMPLICATIONS:  None.      INDICATIONS FOR PROCEDURE:  I was called to operating room #3 today to evaluate Brandi Powers who was a 44-year-old woman who just had a hysterectomy performed by Dr. Mcginnis.  At the end of the procedure  after closing after closing the abdomen, Dr. Mcginnis looked in the bladder and was not certain that she was seeing ureteral jets.  She is particularly concerned about the left side as apparently the dissection on that side was difficult.  After discussion in the operating room, I decided to proceed with bilateral retrograde pyelograms.      DETAILS OF THE PROCEDURE:  When I entered the operating room, the patient was already in lithotomy position.  The abdomen was already closed.  I inserted the cystoscope and performed a complete cystoscopy.  There were no urothelial abnormalities identified.  I first cannulated the left ureteral orifice with ureteral catheter and performed retrograde pyelogram.  The pyelogram was found to be completely normal with no dilatations throughout the left kidney or ureter and no extravasation from the left ureter.  I then cannulated the right orifice with a ureteral catheter and retrograde pyelogram.  This side was also found to be normal with no dilatations throughout the right side collecting system and no extravasation of contrast.  I then drained the bladder, removed the scope and the procedure was concluded.  Dr. Mcginnis recommend the patient not have a Dos Santos catheter left in place at the end of the procedure as is standard after this procedure.         TEE MOE MD             D: 10/07/2019   T: 10/07/2019   MT: FABIÁN       Name:     LANE TELLES   MRN:      [id number]        Account:        [id number]   :      1975           Procedure Date: 10/07/2019      Document: [id number]

## 2019-10-07 NOTE — BRIEF OP NOTE
[facility] Brief Operative Note    Pre-operative diagnosis: Abnormal uterine bleeding  Recurrent endometrial polyps  Benign endometrial hyperplasia   Post-operative diagnosis: Same   Procedure: Total vaginal hysterectomy, cystoscopy, bilateral retrograde pyelogram performed by Dr. Moe from Urology   Surgeon: Nelia Armijo MD   Assistant(s): Renuka eFnton MD   Anesthesia: General Endotracheal Anesthesia   Estimated blood loss: 900 mL   Total IV fluids: 2000 mL   Blood transfusion: No transfusion was given during surgery   Total urine output: 475 mL   Drains: Dos Santos catheter   Specimens: None   Findings: Uterus, cervix, portion of left fallopian tube   Complications: None   Condition: Stable   Comments: See dictated operative report for full details     Ashley Hieronimus, MD [facility]  10/7/2019 7:36 AM

## 2019-10-07 NOTE — BRIEF OP NOTE
Brief Operative Note    Brandi Powers  [id number]   1975   10/7/2019     Preoperative Diagnosis:   Abnormal uterine bleeding  Recurrent endometrial polyps  Benign endometrial hyperplasia    Postoperative Diagnosis:   Same, s/p below stated procedures     Procedure:   - Total vaginal hysterectomy  - Cystoscopy  - Bilateral retrograde pyelogram by Dr. Moe of Urology     Surgeon: Nelia Armijo MD     Assistants: Renuka Fried MD    Anesthesia: GETA with local     EBL: 900 mL  IV Fluids: 2700 mL   UOP: 450cc mL    Specimen: Uterus, cervix, portion of left fallopian tube     Renuka Fenton MD   Pager: [phone]   October 7, 2019, 12:17 PM

## 2019-10-07 NOTE — ANESTHESIA PREPROCEDURE EVALUATION
"Anesthesia Pre-Procedure Evaluation    Patient: Brandi Powers   MRN: [id number] : 1975          Preoperative Diagnosis: recurrent endometrial polyps, simple hyperplasia, pain    Procedure(s):  Total vaginal hysterectomy, possible bilateral salpingectomy, cystoscopy    History reviewed. No pertinent past medical history.  Past Surgical History:   Procedure Laterality Date     BLADDER SURGERY      \" pain in my bladder fixed\"     DILATION AND CURETTAGE, OPERATIVE HYSTEROSCOPY WITH MORCELLATOR, COMBINED N/A 2019    Procedure: Hysteroscopy, dilation and curettage, polypectomy with morcellator;  Surgeon: Nelia Armijo MD;  Location: [facility] OR     IUD removal, laparoscopy, bilateral tubal ligation, excision of left ovarian mass probable fibroma  2014     LAPAROSCOPIC CYSTECTOMY OVARIAN (BENIGN)  6/10/2014    Procedure: LAPAROSCOPIC CYSTECTOMY OVARIAN (BENIGN);  Surgeon: Sam Ferrari MD;  Location: [facility] OR     LAPAROSCOPIC TUBAL LIGATION  6/10/2014    Procedure: LAPAROSCOPIC TUBAL LIGATION;  Surgeon: Sam Ferrari MD;  Location: [facility] OR     REMOVE INTRAUTERINE DEVICE  6/10/2014    Procedure: REMOVE INTRAUTERINE DEVICE;  Surgeon: Sam Ferrari MD;  Location: [facility] OR     Anesthesia Evaluation     .             ROS/MED HX    ENT/Pulmonary:  - neg pulmonary ROS     Neurologic:  - neg neurologic ROS    (-) Neuropathy   Cardiovascular:  - neg cardiovascular ROS   (+) ----. : . . . :. . No previous cardiac testing      (-) CAD, syncope and arrhythmias   METS/Exercise Tolerance:  4 - Raking leaves, gardening   Hematologic:        (-) anemia ( 13.4)   Musculoskeletal:  - neg musculoskeletal ROS       GI/Hepatic:     (+) GERD Asymptomatic on medication,       Renal/Genitourinary:  - ROS Renal section negative       Endo:  - neg endo ROS       Psychiatric:  - neg psychiatric ROS       Infectious Disease:  - neg infectious disease ROS       Malignancy:      - no malignancy   Other:     "                      Physical Exam  Normal systems: cardiovascular, pulmonary and dental    Airway   Mallampati: II  TM distance: >3 FB  Neck ROM: full    Dental     Cardiovascular       Pulmonary             Lab Results   Component Value Date    WBC 9.4 05/06/2019    HGB 13.4 05/06/2019    HCT 40.4 05/06/2019     05/06/2019     05/06/2019    POTASSIUM 4.0 05/06/2019    CHLORIDE 113 (H) 05/06/2019    CO2 22 05/06/2019    BUN 20 05/06/2019    CR 0.53 05/06/2019     (H) 05/06/2019    JUANJOSE 7.7 (L) 05/06/2019    ALBUMIN 3.7 05/06/2019    PROTTOTAL 6.8 05/06/2019    ALT 18 05/06/2019    AST 12 05/06/2019    ALKPHOS 57 05/06/2019    BILITOTAL 0.5 05/06/2019    LIPASE 100 05/06/2019    HCG Negative 08/19/2018       Preop Vitals  BP Readings from Last 3 Encounters:   10/07/19 119/85   05/06/19 123/65   04/09/19 135/74    Pulse Readings from Last 3 Encounters:   05/06/19 88   04/09/19 72   08/19/18 68      Resp Readings from Last 3 Encounters:   10/07/19 16   05/06/19 16   04/09/19 16    SpO2 Readings from Last 3 Encounters:   10/07/19 99%   05/06/19 97%   04/09/19 100%      Temp Readings from Last 1 Encounters:   10/07/19 97.2  F (36.2  C) (Temporal)    Ht Readings from Last 1 Encounters:   05/06/19 1.524 m (5')      Wt Readings from Last 1 Encounters:   10/07/19 61.2 kg (135 lb)    Estimated body mass index is 26.37 kg/m  as calculated from the following:    Height as of 7/9/19: 1.524 m (5').    Weight as of this encounter: 61.2 kg (135 lb).       Anesthesia Plan      History & Physical Review  History and physical reviewed and following examination; no interval change.    ASA Status:  2 .    NPO Status:  > 8 hours    Plan for General and ETT   PONV prophylaxis:  Ondansetron (or other 5HT-3) and Dexamethasone or Solumedrol    Risks, benefits, and plan discussed with  present      Postoperative Care  Postoperative pain management:  IV analgesics, Oral pain medications and Multi-modal analgesia.       Consents  Anesthetic plan, risks, benefits and alternatives discussed with:  Patient..                 Steve Ocasio MD                    .

## 2019-10-07 NOTE — PROGRESS NOTES
"SPIRITUAL HEALTH SERVICES Progress Note  [facility] Pre-Op    Saw pt Brandi MICHEAL Powers per her request for  support before her procedure.  Her spouse Didier was present along with the [place] Interpretor.  Brandi reported that she is having surgery \"to remove her womb\" because of persistent polyps and bleeding.  She acknowledged feeling nervous about the surgery.  Brandi named Didier and her three sons as being core to her support network.  She identifies as [employer] and is affiliated with [employer] in [place].  Brandi requested prayer, which was provided.  Offered reflective listening, validation of feelings, and reassurance of God's active presence.    Pt expects to discharge home after the procedure.     Chivo Carvalho M.Div., [facility]  Staff   Pager [phone]    "

## 2019-10-07 NOTE — ANESTHESIA CARE TRANSFER NOTE
Patient: Brandi Powers    Procedure(s):  Total vaginal hysterectomy, partial left salpingectomy, cystoscopy  Cystoscopy, retrogrades, combined    Diagnosis: recurrent endometrial polyps, simple hyperplasia, pain  Diagnosis Additional Information: No value filed.    Anesthesia Type:   General, ETT     Note:  Airway :Face Mask  Patient transferred to:PACU  Handoff Report: Identifed the Patient, Identified the Reponsible Provider, Reviewed the pertinent medical history, Discussed the surgical course, Reviewed Intra-OP anesthesia mangement and issues during anesthesia, Set expectations for post-procedure period and Allowed opportunity for questions and acknowledgement of understanding      Vitals: (Last set prior to Anesthesia Care Transfer)    CRNA VITALS  10/7/2019 1149 - 10/7/2019 1233      10/7/2019             NIBP:  110/58    NIBP Mean:  75                Electronically Signed By: Kyle Alberto CRNA, APRN CRNA  October 7, 2019  12:33 PM

## 2019-10-07 NOTE — OP NOTE
[facility] Operative Note - Vaginal Hysterectomy    Brandi Powers  [id number]  10/10/2019    Pre-operative diagnosis: Abnormal uterine bleeding  Recurrent endometrial polyps  Benign endometrial hyperplasia   Post-operative diagnosis: Same   Procedure: Total vaginal hysterectomy, cystoscopy, bilateral retrograde pyelogram performed by Dr. Moe from Urology   Surgeon: Nelia Armijo MD   Assistant(s): Renuka Fenton MD   Anesthesia: General Endotracheal Anesthesia   Estimated blood loss: 900 mL   Total IV fluids: 2000 mL   Blood transfusion: No transfusion was given during surgery   Total urine output: 475 mL   Drains: None   Specimens: Uterus, cervix, portion of left fallopian tube   Findings: Normal appearing cervix, normal appearing ovaries, fallopian tube appearance c/w previous tubal ligation. On cystoscopy, ureteral jet is difficult to visualize but thought to be seen on the right and not visualized on the left. No apparent bladder injury. Bilateral retrograde pyelogram performed by general surgery confirms patent ureters with no injury.   Complications: None   Condition: Stable     Indications: Ms. Brandi Powers is a 44 year old  who initially presented to see me with concerns regarding abnormal uterine bleeding in the setting of a previous history of endometrial polyps And previous diagnosis of simple endometrial hyperplasia. Patient had not had treatment previously for For the hyperplasia. Evaluation at that time was undertaken a notable for another endometrial polyp. Given this finding patient was recommended to undergo a hysteroscopy dilation and curettage and polypectomy. At that time patient inquired about whether or not hysterectomy could be done ,however, given her history of hyperplasia it was recommended that she needed a evaluation of the lining of her uterus and removal of the polyp prior to this being an option. At this time patient was agreeable to proceed with above  recommended procedure. This was completed without complication on April 9, 2019 and showed in an endometrial polyp with benign pathology. Patient returns for postoperative visit at which time she reported ongoing issues with bleeding as well as ongoing issues with lower abdominal pain and again requested hysterectomy. At this time we discussed that conservative management was typically recommended as first line to therapy for abnormal uterine bleeding but that given her history of recurrent endometrial polyps and benign hyperplasia, hysterectomy was a reasonable option should she desire if her insurance would cover it and If she understood the risks and was willing to undertake them.  We discussed the risks benefits and alternatives of surgical management with hysterectomy. Informed consent was signed on the day of surgery.    Procedure in detail: Who initially presented to see me with concerns regarding abnormal uterine bleeding in the setting of a previous history of endometrial polyps And previous diagnosis of simple endometrial hyperplasia. Patient had not had treatment previously for For the hyperplasia. Evaluation at that time was undertaken a notable for another endometrial polyp. Given this finding patient was recommended to undergo a hysteroscopy dilation and curettage and polypectomy. At that time patient inquired about whether or not hysterectomy could be done , however, given her history of hyperplasia it was recommended that she needed a evaluation of the lining of her uterus and removal of the polyp prior to this being an option. At this time patient was agreeable to proceed with above recommended procedure. This was completed without complication on April 9, 2019 and showed in an endometrial polyp with benign pathology. Patient returns for postoperative visit at which time she reported ongoing issues with bleeding as well as ongoing issues with lower abdominal pain and again requested hysterectomy. At  this time we discussed that conservative management was typically recommended as first line to therapy for abnormal uterine bleeding but that given her history of recurrent endometrial polyps and benign hyperplasia, hysterectomy was a reasonable option should she desire if her insurance would cover it and If she understood the risks and was willing to undertake them.  We discussed the risks benefits and alternatives of surgical management with hysterectomy. Informed consent was signed on the day of surgery.    Procedure in detail: Who initially presented to see me with concerns regarding abnormal uterine bleeding in the setting of a previous history of endometrial polyps And previous diagnosis of simple endometrial hyperplasia. Patient had not had treatment previously for For the hyperplasia. Evaluation at that time was undertaken a notable for another endometrial polyp. Given this finding patient was recommended to undergo a hysteroscopy dilation and curettage and polypectomy. At that time patient inquired about whether or not hysterectomy could be done , however, given her history of hyperplasia it was recommended that she needed a evaluation of the lining of her uterus and removal of the polyp prior to this being an option. At this time patient was agreeable to proceed with above recommended procedure. This was completed without complication on April 9, 2019 and showed in an endometrial polyp with benign pathology. Patient returns for postoperative visit at which time she reported ongoing issues with bleeding as well as ongoing issues with lower abdominal pain and again requested hysterectomy. At this time we discussed that conservative management was typically recommended as first line to therapy for abnormal uterine bleeding but that given her history of recurrent endometrial polyps and benign hyperplasia, hysterectomy was a reasonable option should she desire if her insurance would cover it and If she  understood the risks and was willing to undertake them.  We discussed the risks benefits and alternatives of surgical management with hysterectomy. Informed consent was signed on the day of surgery.    Procedure in detail: Patient was taken to the operating room where general anesthesia was induced without difficulty. Patient was placed in dorsal lithotomy position using candy cane stirrups and then prepped and draped in usual sterile fashion. A time out was then held where the procedure and patient were confirmed. At this time a weighted speculum was placed into the vagina along with an anterior vaginal wall retractor and the cervix was brought into view. The cervix was grasped with two double tooth tenacula and the cervicovaginal junction was identified and injected circumferentially with 1% lidocaine with epinephrine. A circumferential incision was then made using a scalpel until the correct plane was developed. The anterior vaginal mucosa was then lifted off the cervix and a retractor was placed into this space. The peritoneum was identified and elevated and a sharp incision was made to enter the peritoneal cavity. A [place] was then placed into this opening which allowed the bladder to be retracted superiorly. At this time, attention was turned posteriorly where the posterior vaginal mucosa was dissected bluntly off the cervix until the peritoneum was identified at which time the peritoneum was identified, elevated, and entered sharply. A long weighted speculum as placed into this opening. At this time, the vaginal mucosa was further dissected up on the left and right sides of the cervix to exposure the uterosacral ligaments. The uterosacral ligaments were were clamped, cut, and ligated bilaterally with Narciso stitches. These bites were tagged.The right cardinal ligament was then clamped, cut, and suture ligated with a Narciso stitch of 0 Vicryl. At this time, an additional bite was taken on this side to ligate  the remainder of the uterine vessels. Following this, there was some bleeding noted from the side wall that necessitated placement of a interrupted suture of 0 Vicryl. Bleeding was improved after this. Attention as turned to the left side where the cardinal ligament and uterine vessels were serially clamped, cut, and suture ligated with 0 Vicryl stitches. At this time, successive bites were taken towards the fundus alternatively on each side clamping, cutting, and suture ligating with 0 Vicryl. During this process, there was bleeding from the uterine artery on the right side that was difficult to control. It was ultimately able to be controlled by grasping with right angle clamps and ligated with several figure of X sutures of 0 Vicryl with care to remain as superficial as possible owing to known close proximity of the ureter. Once the majority of the broad ligament was felt to be taken down, the uterine fundus was flipped out to allow easier access to the tubo-ovarian-round ligament complex. The right tubo-ovarian-round ligament complex was clamped with a Narciso clamp. During this process, some tearing did occur but this was visualized to be addressed later. The pedicle was cut and then stick tied with a fore and aft suture of 0 Vicryl followed by a free tie of 0 Vicryl. At this time, the pedicle was examined and the tearing was noted to be coming from a small area of the ovarian capsule. This area was oversewn with a running locked suture of 3-0 Monocryl after which time hemostasis was noted. Attention was turned to the right side where the right tubo-ovarian-round complex was double clamped, cut and suture ligated with a stick tied with a fore and aft suture of 0 Vicryl followed by a free tie of 0 Vicryl. At this time, the pedicles were all carefully examined for hemostasis. There was bleeding noted from the left cardinal ligament pedicle that was controlled with a running suture of 0 Vicryl with care to remain  superficial given the underlying ureter. Tobin powder was placed along the pedicles and along the right ovary where the previous tear had been repaired. At this time, attention was turned to closure of the vaginal cuff. First, a modified Aguilera culdoplasty was completed by placing an 0 Vicryl stitch through the posterior vaginal cuff on the left, then through the left uterosacral ligament from posterior to anterior then through a small bite of the anterior peritoneum, then through the right uterosacral ligament from anterior to posterior, then through a small bite of the posterior posterior peritoneum and then the posterior vaginal cuff near the initial suture. This was tagged to be tied down later. The vaginal cuff was then closed with several figure of X sutures of 0 Vicryl. Following this, excellent hemostasis was noted. At this time, the modified Aguilera's culdoplasty was tied down. Attention was turned to performing the cystoscopy. Patient had previously been given pyridium for this purpose. Dos Santos was removed. Cystoscope was introduced and bladder was filled. Ureteral orifices were visualized bilaterally. There was a likely jet visualized on the left side but no jet visualized on the left side despite several minutes of watching as well as giving the patient fluids and a dose of Lasix. The bladder was otherwise free of injury. Given that there was bleeding from the uterine vessels that necessitated multiple sutures, decision was made to have Urology consult intraoperatively. Dr. Moe came in and recommended retrograde pyelogram. This was completed without complication and noted normal ureters without evidence of injury. Please see his operative note for details of the procedure. Dos Santos was not replaced at this time given plans for same day discharge. Procedure was complete at this time. All counts were correct x 2. Patient tolerated the procedure well. She will be taken to the PACU in stable  condition    Nelia Armijo MD, FACOG  [facility]  10/10/2019 7:05 PM

## 2019-10-08 NOTE — OR NURSING
Patient and family stated they would like to go home.  This RN explained if the patient was unable to void or felt as if her bladder was not completely empty that they needed to return to the ED. Patient and family verbalize an understanding.  Patient and family offered  for this conversation and declined.  Patient's  and brother interpreted this conversation.

## 2019-10-08 NOTE — OR NURSING
Patient voiding spontaneously.  After several voiding attempts, post void residual no longer decreasing.   Patient straight cathed x1.  Patient will attempt to void in one hour.

## 2019-10-09 LAB — COPATH REPORT: NORMAL

## 2021-11-16 ENCOUNTER — TRANSFERRED RECORDS (OUTPATIENT)
Dept: HEALTH INFORMATION MANAGEMENT | Facility: CLINIC | Age: 46
End: 2021-11-16
Payer: COMMERCIAL

## 2021-11-17 ENCOUNTER — HOSPITAL ENCOUNTER (OUTPATIENT)
Facility: CLINIC | Age: 46
End: 2021-11-17
Attending: SURGERY | Admitting: SURGERY
Payer: COMMERCIAL

## 2021-11-17 DIAGNOSIS — Z11.59 ENCOUNTER FOR SCREENING FOR OTHER VIRAL DISEASES: ICD-10-CM

## 2021-12-09 NOTE — OR NURSING
Call[place] [id number] pt states she told the provider she will not come on the 13th and needs to reschedule.  Pt instructed to follow up with provider. Spoke with Dr. Garcia, Dr. Garcia will reschedule her.

## 2021-12-15 ENCOUNTER — LAB (OUTPATIENT)
Dept: LAB | Facility: CLINIC | Age: 46
End: 2021-12-15
Attending: SURGERY
Payer: COMMERCIAL

## 2021-12-15 DIAGNOSIS — Z11.59 ENCOUNTER FOR SCREENING FOR OTHER VIRAL DISEASES: ICD-10-CM

## 2021-12-15 PROCEDURE — U0003 INFECTIOUS AGENT DETECTION BY NUCLEIC ACID (DNA OR RNA); SEVERE ACUTE RESPIRATORY SYNDROME CORONAVIRUS 2 (SARS-COV-2) (CORONAVIRUS DISEASE [COVID-19]), AMPLIFIED PROBE TECHNIQUE, MAKING USE OF HIGH THROUGHPUT TECHNOLOGIES AS DESCRIBED BY CMS-2020-01-R: HCPCS

## 2021-12-15 PROCEDURE — U0005 INFEC AGEN DETEC AMPLI PROBE: HCPCS

## 2021-12-16 LAB — SARS-COV-2 RNA RESP QL NAA+PROBE: NEGATIVE

## 2021-12-17 ENCOUNTER — ANESTHESIA EVENT (OUTPATIENT)
Dept: GASTROENTEROLOGY | Facility: CLINIC | Age: 46
End: 2021-12-17
Payer: COMMERCIAL

## 2021-12-17 ENCOUNTER — ANESTHESIA (OUTPATIENT)
Dept: GASTROENTEROLOGY | Facility: CLINIC | Age: 46
End: 2021-12-17
Payer: COMMERCIAL

## 2021-12-17 ENCOUNTER — HOSPITAL ENCOUNTER (OUTPATIENT)
Facility: CLINIC | Age: 46
Discharge: HOME | End: 2021-12-17
Attending: SURGERY | Admitting: SURGERY
Payer: COMMERCIAL

## 2021-12-17 VITALS
SYSTOLIC BLOOD PRESSURE: 125 MMHG | OXYGEN SATURATION: 100 % | HEART RATE: 55 BPM | RESPIRATION RATE: 36 BRPM | DIASTOLIC BLOOD PRESSURE: 70 MMHG

## 2021-12-17 DIAGNOSIS — K64.9 HEMORRHOIDS, UNSPECIFIED HEMORRHOID TYPE: Primary | ICD-10-CM

## 2021-12-17 LAB — COLONOSCOPY: NORMAL

## 2021-12-17 PROCEDURE — 45378 DIAGNOSTIC COLONOSCOPY: CPT | Performed by: SURGERY

## 2021-12-17 PROCEDURE — 370N000017 HC ANESTHESIA TECHNICAL FEE, PER MIN: Performed by: SURGERY

## 2021-12-17 PROCEDURE — 250N000011 HC RX IP 250 OP 636: Performed by: NURSE ANESTHETIST, CERTIFIED REGISTERED

## 2021-12-17 PROCEDURE — 258N000003 HC RX IP 258 OP 636: Performed by: NURSE ANESTHETIST, CERTIFIED REGISTERED

## 2021-12-17 PROCEDURE — 250N000009 HC RX 250: Performed by: NURSE ANESTHETIST, CERTIFIED REGISTERED

## 2021-12-17 PROCEDURE — 999N000010 HC STATISTIC ANES STAT CODE-CRNA PER MINUTE: Performed by: SURGERY

## 2021-12-17 RX ORDER — PROCHLORPERAZINE MALEATE 10 MG
10 TABLET ORAL EVERY 6 HOURS PRN
Status: DISCONTINUED | OUTPATIENT
Start: 2021-12-17 | End: 2021-12-17 | Stop reason: HOSPADM

## 2021-12-17 RX ORDER — NALOXONE HYDROCHLORIDE 0.4 MG/ML
0.4 INJECTION, SOLUTION INTRAMUSCULAR; INTRAVENOUS; SUBCUTANEOUS
Status: DISCONTINUED | OUTPATIENT
Start: 2021-12-17 | End: 2021-12-17 | Stop reason: HOSPADM

## 2021-12-17 RX ORDER — ONDANSETRON 2 MG/ML
4 INJECTION INTRAMUSCULAR; INTRAVENOUS EVERY 6 HOURS PRN
Status: DISCONTINUED | OUTPATIENT
Start: 2021-12-17 | End: 2021-12-17 | Stop reason: HOSPADM

## 2021-12-17 RX ORDER — NALOXONE HYDROCHLORIDE 0.4 MG/ML
0.2 INJECTION, SOLUTION INTRAMUSCULAR; INTRAVENOUS; SUBCUTANEOUS
Status: DISCONTINUED | OUTPATIENT
Start: 2021-12-17 | End: 2021-12-17 | Stop reason: HOSPADM

## 2021-12-17 RX ORDER — SODIUM CHLORIDE 9 MG/ML
INJECTION, SOLUTION INTRAVENOUS CONTINUOUS PRN
Status: DISCONTINUED | OUTPATIENT
Start: 2021-12-17 | End: 2021-12-17

## 2021-12-17 RX ORDER — ONDANSETRON 2 MG/ML
4 INJECTION INTRAMUSCULAR; INTRAVENOUS
Status: DISCONTINUED | OUTPATIENT
Start: 2021-12-17 | End: 2021-12-17 | Stop reason: HOSPADM

## 2021-12-17 RX ORDER — ONDANSETRON 4 MG/1
4 TABLET, ORALLY DISINTEGRATING ORAL EVERY 6 HOURS PRN
Status: DISCONTINUED | OUTPATIENT
Start: 2021-12-17 | End: 2021-12-17 | Stop reason: HOSPADM

## 2021-12-17 RX ORDER — FLUMAZENIL 0.1 MG/ML
0.2 INJECTION, SOLUTION INTRAVENOUS
Status: DISCONTINUED | OUTPATIENT
Start: 2021-12-17 | End: 2021-12-17 | Stop reason: HOSPADM

## 2021-12-17 RX ORDER — SODIUM CHLORIDE, SODIUM LACTATE, POTASSIUM CHLORIDE, CALCIUM CHLORIDE 600; 310; 30; 20 MG/100ML; MG/100ML; MG/100ML; MG/100ML
INJECTION, SOLUTION INTRAVENOUS CONTINUOUS
Status: DISCONTINUED | OUTPATIENT
Start: 2021-12-17 | End: 2021-12-17 | Stop reason: HOSPADM

## 2021-12-17 RX ORDER — LIDOCAINE 40 MG/G
CREAM TOPICAL
Status: DISCONTINUED | OUTPATIENT
Start: 2021-12-17 | End: 2021-12-17 | Stop reason: HOSPADM

## 2021-12-17 RX ORDER — LIDOCAINE HYDROCHLORIDE 20 MG/ML
INJECTION, SOLUTION INFILTRATION; PERINEURAL PRN
Status: DISCONTINUED | OUTPATIENT
Start: 2021-12-17 | End: 2021-12-17

## 2021-12-17 RX ORDER — PROPOFOL 10 MG/ML
INJECTION, EMULSION INTRAVENOUS CONTINUOUS PRN
Status: DISCONTINUED | OUTPATIENT
Start: 2021-12-17 | End: 2021-12-17

## 2021-12-17 RX ADMIN — SODIUM CHLORIDE: 9 INJECTION, SOLUTION INTRAVENOUS at 12:00

## 2021-12-17 RX ADMIN — LIDOCAINE HYDROCHLORIDE 60 MG: 20 INJECTION, SOLUTION INFILTRATION; PERINEURAL at 12:04

## 2021-12-17 RX ADMIN — PROPOFOL 100 MCG/KG/MIN: 10 INJECTION, EMULSION INTRAVENOUS at 12:04

## 2021-12-17 ASSESSMENT — LIFESTYLE VARIABLES: TOBACCO_USE: 0

## 2021-12-17 ASSESSMENT — ENCOUNTER SYMPTOMS: SEIZURES: 0

## 2021-12-17 NOTE — ANESTHESIA CARE TRANSFER NOTE
Patient: Brandi Powers    Procedure: Procedure(s):  COLONOSCOPY       Diagnosis: Rectal bleeding [K62.5]  Constipation [K59.00]  Acute hemorrhoid [K64.9]  Diagnosis Additional Information: No value filed.    Anesthesia Type:   MAC     Note:    Oropharynx: oropharynx clear of all foreign objects  Level of Consciousness: awake  Oxygen Supplementation: room air    Independent Airway: airway patency satisfactory and stable  Dentition: dentition unchanged      Patient transferred to: Phase II  Comments: At end of procedure, spontaneous respirations, patient alert to voice, able to follow commands. Patient breathing room air at room air to PACU. SpO2, NiBP, and EKG monitors and alarms on and functioning, report on patient's clinical status given to PACU RN, RN questions answered.  Handoff Report: Identifed the Patient, Identified the Reponsible Provider, Reviewed the pertinent medical history, Discussed the surgical course, Reviewed Intra-OP anesthesia mangement and issues during anesthesia, Set expectations for post-procedure period and Allowed opportunity for questions and acknowledgement of understanding      Vitals:  Vitals Value Taken Time   BP     Temp     Pulse     Resp 43 12/17/21 1254   SpO2 99 % 12/17/21 1254   Vitals shown include unvalidated device data.    Electronically Signed By: MARISOL Ramirez CRNA  December 17, 2021  12:55 PM

## 2021-12-17 NOTE — ANESTHESIA PREPROCEDURE EVALUATION
"Anesthesia Pre-Procedure Evaluation    Patient: Brandi Powers   MRN: [id number] : 1975        Preoperative Diagnosis: Rectal bleeding [K62.5]  Constipation [K59.00]  Acute hemorrhoid [K64.9]    Procedure : Procedure(s):  COLONOSCOPY          No past medical history on file.   Past Surgical History:   Procedure Laterality Date     BLADDER SURGERY      \" pain in my bladder fixed\"     CYSTOSCOPY, RETROGRADES, COMBINED Bilateral 10/7/2019    Procedure: Cystoscopy with bilateral retrogrades;  Surgeon: Nelia Armijo MD;  Location: [facility]     DILATION AND CURETTAGE, OPERATIVE HYSTEROSCOPY WITH MORCELLATOR, COMBINED N/A 2019    Procedure: Hysteroscopy, dilation and curettage, polypectomy with morcellator;  Surgeon: Nelia Armijo MD;  Location: [facility]     HYSTERECTOMY VAGINAL, BILATERAL SALPINGO-OOPHERECTOMY, COMBINED Left 10/7/2019    Procedure: Total vaginal hysterectomy, partial left salpingectomy, cystoscopy with bilateral retrogrades;  Surgeon: Nelia Armijo MD;  Location: [facility]     IUD removal, laparoscopy, bilateral tubal ligation, excision of left ovarian mass probable fibroma  2014     LAPAROSCOPIC CYSTECTOMY OVARIAN (BENIGN)  6/10/2014    Procedure: LAPAROSCOPIC CYSTECTOMY OVARIAN (BENIGN);  Surgeon: Sam Ferrari MD;  Location: [facility]     LAPAROSCOPIC TUBAL LIGATION  6/10/2014    Procedure: LAPAROSCOPIC TUBAL LIGATION;  Surgeon: Sam Ferrari MD;  Location: [facility]     REMOVE INTRAUTERINE DEVICE  6/10/2014    Procedure: REMOVE INTRAUTERINE DEVICE;  Surgeon: Sam Ferrari MD;  Location: [facility]      No Known Allergies   Social History     Tobacco Use     Smoking status: Never Smoker     Smokeless tobacco: Never Used   Substance Use Topics     Alcohol use: No      Wt Readings from Last 1 Encounters:   10/07/19 61.2 kg (135 lb)        Anesthesia Evaluation            ROS/MED HX  ENT/Pulmonary:    (-) tobacco use, asthma and sleep apnea   Neurologic:    (-) " no seizures and no CVA   Cardiovascular:    (-) hypertension   METS/Exercise Tolerance: >4 METS    Hematologic:       Musculoskeletal:       GI/Hepatic:    (-) GERD and liver disease   Renal/Genitourinary:    (-) renal disease   Endo:    (-) Type II DM and thyroid disease   Psychiatric/Substance Use:       Infectious Disease:       Malignancy:       Other:            Physical Exam    Airway        Mallampati: II   TM distance: > 3 FB   Neck ROM: full   Mouth opening: > 3 cm    Respiratory Devices and Support         Dental  no notable dental history         Cardiovascular          Rhythm and rate: regular and normal     Pulmonary   pulmonary exam normal                OUTSIDE LABS:  CBC:   Lab Results   Component Value Date    WBC 9.4 05/06/2019    WBC 6.4 08/19/2018    HGB 10.7 (L) 10/07/2019    HGB 13.4 05/06/2019    HCT 40.4 05/06/2019    HCT 39.7 08/19/2018     05/06/2019     08/19/2018     BMP:   Lab Results   Component Value Date     05/06/2019     08/19/2018    POTASSIUM 4.0 05/06/2019    POTASSIUM 3.8 08/19/2018    CHLORIDE 113 (H) 05/06/2019    CHLORIDE 109 08/19/2018    CO2 22 05/06/2019    CO2 25 08/19/2018    BUN 20 05/06/2019    BUN 12 08/19/2018    CR 0.53 05/06/2019    CR 0.68 08/19/2018     (H) 05/06/2019    GLC 91 08/19/2018     COAGS: No results found for: PTT, INR, FIBR  POC:   Lab Results   Component Value Date    HCG Negative 10/07/2019     HEPATIC:   Lab Results   Component Value Date    ALBUMIN 3.7 05/06/2019    PROTTOTAL 6.8 05/06/2019    ALT 18 05/06/2019    AST 12 05/06/2019    ALKPHOS 57 05/06/2019    BILITOTAL 0.5 05/06/2019     OTHER:   Lab Results   Component Value Date    JUANJOSE 7.7 (L) 05/06/2019    LIPASE 100 05/06/2019       Anesthesia Plan    ASA Status:  1   NPO Status:  NPO Appropriate    Anesthesia Type: MAC.     - Reason for MAC: straight local not clinically adequate              Consents    Anesthesia Plan(s) and associated risks, benefits, and  realistic alternatives discussed. Questions answered and patient/representative(s) expressed understanding.    - Discussed:     - Discussed with:  Patient         Postoperative Care       PONV prophylaxis: Ondansetron (or other 5HT-3)     Comments:                Vimal Walker MD

## 2021-12-17 NOTE — ANESTHESIA POSTPROCEDURE EVALUATION
Patient: Brandi Powers    Procedure: Procedure(s):  COLONOSCOPY       Diagnosis:Rectal bleeding [K62.5]  Constipation [K59.00]  Acute hemorrhoid [K64.9]  Diagnosis Additional Information: No value filed.    Anesthesia Type:  MAC    Note:     Postop Pain Control: Uneventful            Sign Out: Well controlled pain   PONV: No   Neuro/Psych: Uneventful            Sign Out: Acceptable/Baseline neuro status   Airway/Respiratory: Uneventful            Sign Out: Acceptable/Baseline resp. status   CV/Hemodynamics: Uneventful            Sign Out: Acceptable CV status; No obvious hypovolemia; No obvious fluid overload   Other NRE: NONE   DID A NON-ROUTINE EVENT OCCUR? No           Last vitals:  Vitals Value Taken Time   /70 12/17/21 1334   Temp     Pulse 59 12/17/21 1330   Resp 15 12/17/21 1316   SpO2 80 % 12/17/21 1316   Vitals shown include unvalidated device data.    Electronically Signed By: Mercy Murphy MD, MD  December 17, 2021  3:41 PM

## 2021-12-17 NOTE — H&P
H&P Update    Brandi is here today for scheduled procedure.  /68   Pulse 62   Resp 16   SpO2 100%    No changes since last visit in Clinic  Will proceed as planned with Rectal exam under anesthesia, Colonoscopy with possible biopsies and polypectomies.    Rina Garcia MD  General Surgeon  [facility] [address] [place] [id number]

## 2022-01-04 DIAGNOSIS — K64.2 THIRD DEGREE HEMORRHOIDS: Primary | ICD-10-CM

## 2022-01-04 DIAGNOSIS — Z11.59 ENCOUNTER FOR SCREENING FOR OTHER VIRAL DISEASES: ICD-10-CM

## 2022-01-11 ENCOUNTER — NURSE TRIAGE (OUTPATIENT)
Dept: NURSING | Facility: CLINIC | Age: 47
End: 2022-01-11
Payer: COMMERCIAL

## 2022-01-11 ENCOUNTER — APPOINTMENT (OUTPATIENT)
Dept: INTERPRETER SERVICES | Facility: CLINIC | Age: 47
End: 2022-01-11
Payer: COMMERCIAL

## 2022-01-11 NOTE — TELEPHONE ENCOUNTER
Pt calls re pre-procedure covid testing.  Already has appt scheduled.  However wishes to change to different testing location.  Transferred to a  for this purpose.    Jen [facility] Nurse Advisor     Additional Information    [1] Follow-up call to recent contact AND [2] information only call, no triage required    Protocols used: INFORMATION ONLY CALL-A-AH

## 2022-01-12 ENCOUNTER — LAB (OUTPATIENT)
Dept: LAB | Facility: CLINIC | Age: 47
End: 2022-01-12
Attending: SURGERY
Payer: COMMERCIAL

## 2022-01-12 DIAGNOSIS — Z11.59 ENCOUNTER FOR SCREENING FOR OTHER VIRAL DISEASES: ICD-10-CM

## 2022-01-12 PROCEDURE — U0005 INFEC AGEN DETEC AMPLI PROBE: HCPCS

## 2022-01-12 PROCEDURE — U0003 INFECTIOUS AGENT DETECTION BY NUCLEIC ACID (DNA OR RNA); SEVERE ACUTE RESPIRATORY SYNDROME CORONAVIRUS 2 (SARS-COV-2) (CORONAVIRUS DISEASE [COVID-19]), AMPLIFIED PROBE TECHNIQUE, MAKING USE OF HIGH THROUGHPUT TECHNOLOGIES AS DESCRIBED BY CMS-2020-01-R: HCPCS

## 2022-01-13 ENCOUNTER — ANESTHESIA EVENT (OUTPATIENT)
Dept: SURGERY | Facility: CLINIC | Age: 47
End: 2022-01-13
Payer: COMMERCIAL

## 2022-01-13 ENCOUNTER — APPOINTMENT (OUTPATIENT)
Dept: INTERPRETER SERVICES | Facility: CLINIC | Age: 47
End: 2022-01-13
Payer: COMMERCIAL

## 2022-01-13 PROBLEM — K64.2 THIRD DEGREE HEMORRHOIDS: Status: ACTIVE | Noted: 2021-12-17

## 2022-01-13 LAB — SARS-COV-2 RNA RESP QL NAA+PROBE: NEGATIVE

## 2022-01-14 ENCOUNTER — ANESTHESIA (OUTPATIENT)
Dept: SURGERY | Facility: CLINIC | Age: 47
End: 2022-01-14
Payer: COMMERCIAL

## 2022-01-14 ENCOUNTER — HOSPITAL ENCOUNTER (OUTPATIENT)
Facility: CLINIC | Age: 47
Discharge: HOME | End: 2022-01-14
Attending: SURGERY | Admitting: SURGERY
Payer: COMMERCIAL

## 2022-01-14 VITALS
TEMPERATURE: 98 F | RESPIRATION RATE: 16 BRPM | WEIGHT: 136.7 LBS | OXYGEN SATURATION: 98 % | SYSTOLIC BLOOD PRESSURE: 120 MMHG | BODY MASS INDEX: 26.84 KG/M2 | DIASTOLIC BLOOD PRESSURE: 75 MMHG | HEIGHT: 60 IN | HEART RATE: 64 BPM

## 2022-01-14 DIAGNOSIS — K64.2 THIRD DEGREE HEMORRHOIDS: Primary | ICD-10-CM

## 2022-01-14 PROCEDURE — 250N000011 HC RX IP 250 OP 636: Performed by: NURSE ANESTHETIST, CERTIFIED REGISTERED

## 2022-01-14 PROCEDURE — 360N000075 HC SURGERY LEVEL 2, PER MIN: Performed by: SURGERY

## 2022-01-14 PROCEDURE — 710N000009 HC RECOVERY PHASE 1, LEVEL 1, PER MIN: Performed by: SURGERY

## 2022-01-14 PROCEDURE — 258N000003 HC RX IP 258 OP 636: Performed by: ANESTHESIOLOGY

## 2022-01-14 PROCEDURE — 278N000051 HC OR IMPLANT GENERAL: Performed by: SURGERY

## 2022-01-14 PROCEDURE — 250N000013 HC RX MED GY IP 250 OP 250 PS 637: Performed by: SURGERY

## 2022-01-14 PROCEDURE — 250N000009 HC RX 250: Performed by: SURGERY

## 2022-01-14 PROCEDURE — 250N000011 HC RX IP 250 OP 636: Performed by: SURGERY

## 2022-01-14 PROCEDURE — 999N000141 HC STATISTIC PRE-PROCEDURE NURSING ASSESSMENT: Performed by: SURGERY

## 2022-01-14 PROCEDURE — 710N000012 HC RECOVERY PHASE 2, PER MINUTE: Performed by: SURGERY

## 2022-01-14 PROCEDURE — 370N000017 HC ANESTHESIA TECHNICAL FEE, PER MIN: Performed by: SURGERY

## 2022-01-14 PROCEDURE — 272N000001 HC OR GENERAL SUPPLY STERILE: Performed by: SURGERY

## 2022-01-14 DEVICE — IMPLANTABLE DEVICE: Type: IMPLANTABLE DEVICE | Site: RECTUM | Status: FUNCTIONAL

## 2022-01-14 RX ORDER — SODIUM CHLORIDE, SODIUM LACTATE, POTASSIUM CHLORIDE, CALCIUM CHLORIDE 600; 310; 30; 20 MG/100ML; MG/100ML; MG/100ML; MG/100ML
INJECTION, SOLUTION INTRAVENOUS CONTINUOUS
Status: DISCONTINUED | OUTPATIENT
Start: 2022-01-14 | End: 2022-01-14 | Stop reason: HOSPADM

## 2022-01-14 RX ORDER — ACETAMINOPHEN 325 MG/1
650 TABLET ORAL
Status: DISCONTINUED | OUTPATIENT
Start: 2022-01-14 | End: 2022-01-14 | Stop reason: HOSPADM

## 2022-01-14 RX ORDER — HYDROMORPHONE HCL IN WATER/PF 6 MG/30 ML
0.2 PATIENT CONTROLLED ANALGESIA SYRINGE INTRAVENOUS EVERY 5 MIN PRN
Status: DISCONTINUED | OUTPATIENT
Start: 2022-01-14 | End: 2022-01-14 | Stop reason: HOSPADM

## 2022-01-14 RX ORDER — ONDANSETRON 4 MG/1
4 TABLET, ORALLY DISINTEGRATING ORAL
Status: DISCONTINUED | OUTPATIENT
Start: 2022-01-14 | End: 2022-01-14 | Stop reason: HOSPADM

## 2022-01-14 RX ORDER — FENTANYL CITRATE 50 UG/ML
INJECTION, SOLUTION INTRAMUSCULAR; INTRAVENOUS PRN
Status: DISCONTINUED | OUTPATIENT
Start: 2022-01-14 | End: 2022-01-14

## 2022-01-14 RX ORDER — IBUPROFEN 800 MG/1
800 TABLET, FILM COATED ORAL EVERY 8 HOURS PRN
Qty: 42 TABLET | Refills: 0 | Status: SHIPPED | OUTPATIENT
Start: 2022-01-14 | End: 2022-01-28

## 2022-01-14 RX ORDER — ONDANSETRON 2 MG/ML
4 INJECTION INTRAMUSCULAR; INTRAVENOUS EVERY 30 MIN PRN
Status: DISCONTINUED | OUTPATIENT
Start: 2022-01-14 | End: 2022-01-14 | Stop reason: HOSPADM

## 2022-01-14 RX ORDER — AMOXICILLIN 250 MG
1-2 CAPSULE ORAL 2 TIMES DAILY
Qty: 30 TABLET | Refills: 0 | Status: SHIPPED | OUTPATIENT
Start: 2022-01-14 | End: 2023-01-24

## 2022-01-14 RX ORDER — MEPERIDINE HYDROCHLORIDE 25 MG/ML
12.5 INJECTION INTRAMUSCULAR; INTRAVENOUS; SUBCUTANEOUS
Status: DISCONTINUED | OUTPATIENT
Start: 2022-01-14 | End: 2022-01-14 | Stop reason: HOSPADM

## 2022-01-14 RX ORDER — ONDANSETRON 2 MG/ML
INJECTION INTRAMUSCULAR; INTRAVENOUS PRN
Status: DISCONTINUED | OUTPATIENT
Start: 2022-01-14 | End: 2022-01-14

## 2022-01-14 RX ORDER — FENTANYL CITRATE 0.05 MG/ML
25 INJECTION, SOLUTION INTRAMUSCULAR; INTRAVENOUS
Status: DISCONTINUED | OUTPATIENT
Start: 2022-01-14 | End: 2022-01-14 | Stop reason: HOSPADM

## 2022-01-14 RX ORDER — ACETAMINOPHEN 325 MG/1
975 TABLET ORAL ONCE
Status: COMPLETED | OUTPATIENT
Start: 2022-01-14 | End: 2022-01-14

## 2022-01-14 RX ORDER — PROPOFOL 10 MG/ML
INJECTION, EMULSION INTRAVENOUS CONTINUOUS PRN
Status: DISCONTINUED | OUTPATIENT
Start: 2022-01-14 | End: 2022-01-14

## 2022-01-14 RX ORDER — DEXAMETHASONE SODIUM PHOSPHATE 4 MG/ML
INJECTION, SOLUTION INTRA-ARTICULAR; INTRALESIONAL; INTRAMUSCULAR; INTRAVENOUS; SOFT TISSUE PRN
Status: DISCONTINUED | OUTPATIENT
Start: 2022-01-14 | End: 2022-01-14

## 2022-01-14 RX ORDER — CEFAZOLIN SODIUM 2 G/100ML
2 INJECTION, SOLUTION INTRAVENOUS
Status: COMPLETED | OUTPATIENT
Start: 2022-01-14 | End: 2022-01-14

## 2022-01-14 RX ORDER — ONDANSETRON 4 MG/1
4 TABLET, ORALLY DISINTEGRATING ORAL EVERY 30 MIN PRN
Status: DISCONTINUED | OUTPATIENT
Start: 2022-01-14 | End: 2022-01-14 | Stop reason: HOSPADM

## 2022-01-14 RX ORDER — ACETAMINOPHEN 325 MG/1
650 TABLET ORAL 4 TIMES DAILY
Qty: 32 TABLET | Refills: 0 | Status: SHIPPED | OUTPATIENT
Start: 2022-01-14 | End: 2022-01-18

## 2022-01-14 RX ORDER — ONDANSETRON 4 MG/1
8 TABLET, ORALLY DISINTEGRATING ORAL
Status: DISCONTINUED | OUTPATIENT
Start: 2022-01-14 | End: 2022-01-14 | Stop reason: HOSPADM

## 2022-01-14 RX ORDER — FENTANYL CITRATE 0.05 MG/ML
25 INJECTION, SOLUTION INTRAMUSCULAR; INTRAVENOUS EVERY 5 MIN PRN
Status: DISCONTINUED | OUTPATIENT
Start: 2022-01-14 | End: 2022-01-14 | Stop reason: HOSPADM

## 2022-01-14 RX ORDER — CEFAZOLIN SODIUM 2 G/100ML
2 INJECTION, SOLUTION INTRAVENOUS SEE ADMIN INSTRUCTIONS
Status: DISCONTINUED | OUTPATIENT
Start: 2022-01-14 | End: 2022-01-14 | Stop reason: HOSPADM

## 2022-01-14 RX ORDER — PROPOFOL 10 MG/ML
INJECTION, EMULSION INTRAVENOUS PRN
Status: DISCONTINUED | OUTPATIENT
Start: 2022-01-14 | End: 2022-01-14

## 2022-01-14 RX ORDER — MAGNESIUM HYDROXIDE 1200 MG/15ML
LIQUID ORAL PRN
Status: DISCONTINUED | OUTPATIENT
Start: 2022-01-14 | End: 2022-01-14 | Stop reason: HOSPADM

## 2022-01-14 RX ADMIN — SODIUM CHLORIDE, POTASSIUM CHLORIDE, SODIUM LACTATE AND CALCIUM CHLORIDE: 600; 310; 30; 20 INJECTION, SOLUTION INTRAVENOUS at 06:57

## 2022-01-14 RX ADMIN — METRONIDAZOLE 500 MG: 500 INJECTION, SOLUTION INTRAVENOUS at 06:52

## 2022-01-14 RX ADMIN — CEFAZOLIN SODIUM 2 G: 2 INJECTION, SOLUTION INTRAVENOUS at 07:42

## 2022-01-14 RX ADMIN — MIDAZOLAM 2 MG: 1 INJECTION INTRAMUSCULAR; INTRAVENOUS at 07:43

## 2022-01-14 RX ADMIN — PROPOFOL 140 MCG/KG/MIN: 10 INJECTION, EMULSION INTRAVENOUS at 07:54

## 2022-01-14 RX ADMIN — PROPOFOL 20 MG: 10 INJECTION, EMULSION INTRAVENOUS at 07:54

## 2022-01-14 RX ADMIN — DEXAMETHASONE SODIUM PHOSPHATE 4 MG: 4 INJECTION, SOLUTION INTRA-ARTICULAR; INTRALESIONAL; INTRAMUSCULAR; INTRAVENOUS; SOFT TISSUE at 07:58

## 2022-01-14 RX ADMIN — FENTANYL CITRATE 50 MCG: 50 INJECTION, SOLUTION INTRAMUSCULAR; INTRAVENOUS at 07:59

## 2022-01-14 RX ADMIN — ACETAMINOPHEN 975 MG: 325 TABLET, FILM COATED ORAL at 06:44

## 2022-01-14 RX ADMIN — PROPOFOL 20 MG: 10 INJECTION, EMULSION INTRAVENOUS at 07:59

## 2022-01-14 RX ADMIN — ONDANSETRON 4 MG: 2 INJECTION INTRAMUSCULAR; INTRAVENOUS at 07:58

## 2022-01-14 RX ADMIN — SODIUM CHLORIDE, POTASSIUM CHLORIDE, SODIUM LACTATE AND CALCIUM CHLORIDE: 600; 310; 30; 20 INJECTION, SOLUTION INTRAVENOUS at 07:42

## 2022-01-14 ASSESSMENT — MIFFLIN-ST. JEOR: SCORE: 1176.57

## 2022-01-14 NOTE — ANESTHESIA CARE TRANSFER NOTE
Patient: Brandi Powers    Procedure: Procedure(s):  LIGATION, HEMORRHOID, INTERNAL       Diagnosis: Third degree hemorrhoids [K64.2]  Diagnosis Additional Information: No value filed.    Anesthesia Type:   MAC     Note:    Oropharynx: oropharynx clear of all foreign objects and spontaneously breathing  Level of Consciousness: awake  Oxygen Supplementation: nasal cannula  Level of Supplemental Oxygen (L/min / FiO2): 3  Independent Airway: airway patency satisfactory and stable  Dentition: dentition unchanged  Vital Signs Stable: post-procedure vital signs reviewed and stable  Report to RN Given: handoff report given  Patient transferred to: PACU  Comments:     At end of procedure, spontaneous respirations, patient alert to voice, able to follow commands. Oxygen via nasal cannula at 3 liters per minute to PACU. Oxygen tubing connected to wall O2 in PACU, SpO2, NiBP, and EKG monitors and alarms on and functioning, Kenia Hugger warmer connected to patient gown, report on patient's clinical status given to PACU RN, RN questions answered.      Handoff Report: Identifed the Patient, Identified the Reponsible Provider, Reviewed the pertinent medical history, Discussed the surgical course, Reviewed Intra-OP anesthesia mangement and issues during anesthesia, Set expectations for post-procedure period and Allowed opportunity for questions and acknowledgement of understanding      Vitals: See Anesthesia Record.    Electronically Signed By: MARISOL Herring CRNA  January 14, 2022  8:48 AM

## 2022-01-14 NOTE — ANESTHESIA PREPROCEDURE EVALUATION
"Anesthesia Pre-Procedure Evaluation    Patient: Brandi Powers   MRN: [id number] : 1975        Preoperative Diagnosis: Third degree hemorrhoids [K64.2]    Procedure : Procedure(s):  LIGATION, HEMORRHOID, INTERNAL          Past Medical History:   Diagnosis Date     Constipation      Hemorrhoids, unspecified hemorrhoid type       Past Surgical History:   Procedure Laterality Date     BLADDER SURGERY      \" pain in my bladder fixed\"     CHOLECYSTECTOMY       COLONOSCOPY N/A 2021    Procedure: COLONOSCOPY;  Surgeon: Rina Garcia MD;  Location: [facility]     CYSTOSCOPY, RETROGRADES, COMBINED Bilateral 10/07/2019    Procedure: Cystoscopy with bilateral retrogrades;  Surgeon: Nelia Armijo MD;  Location: [facility]     DILATION AND CURETTAGE, OPERATIVE HYSTEROSCOPY WITH MORCELLATOR, COMBINED N/A 2019    Procedure: Hysteroscopy, dilation and curettage, polypectomy with morcellator;  Surgeon: Nelia Armijo MD;  Location: [facility]     HYSTERECTOMY VAGINAL, BILATERAL SALPINGO-OOPHERECTOMY, COMBINED Left 10/07/2019    Procedure: Total vaginal hysterectomy, partial left salpingectomy, cystoscopy with bilateral retrogrades;  Surgeon: Nelia Armijo MD;  Location: [facility]     IUD removal, laparoscopy, bilateral tubal ligation, excision of left ovarian mass probable fibroma  2014     LAPAROSCOPIC CYSTECTOMY OVARIAN (BENIGN)  06/10/2014    Procedure: LAPAROSCOPIC CYSTECTOMY OVARIAN (BENIGN);  Surgeon: Sam Ferrari MD;  Location: [facility]     LAPAROSCOPIC TUBAL LIGATION  06/10/2014    Procedure: LAPAROSCOPIC TUBAL LIGATION;  Surgeon: Sam Ferrari MD;  Location: [facility]     REMOVE INTRAUTERINE DEVICE  06/10/2014    Procedure: REMOVE INTRAUTERINE DEVICE;  Surgeon: Sam Ferrari MD;  Location: [facility]      No Known Allergies   Social History     Tobacco Use     Smoking status: Never Smoker     Smokeless tobacco: Never Used   Substance Use Topics     Alcohol use: No    "   Wt Readings from Last 1 Encounters:   01/14/22 62 kg (136 lb 11.2 oz)        Anesthesia Evaluation   Pt has had prior anesthetic.     No history of anesthetic complications       ROS/MED HX  ENT/Pulmonary:  - neg pulmonary ROS     Neurologic:  - neg neurologic ROS     Cardiovascular:  - neg cardiovascular ROS     METS/Exercise Tolerance:     Hematologic:  - neg hematologic  ROS     Musculoskeletal:  - neg musculoskeletal ROS     GI/Hepatic: Comment: H/o lap harmony - neg GI/hepatic ROS     Renal/Genitourinary: Comment: S/p hysterectomy - neg Renal ROS     Endo:  - neg endo ROS     Psychiatric/Substance Use:  - neg psychiatric ROS     Infectious Disease:  - neg infectious disease ROS     Malignancy:       Other:            Physical Exam    Airway  airway exam normal           Respiratory Devices and Support         Dental  no notable dental history         Cardiovascular   cardiovascular exam normal          Pulmonary   pulmonary exam normal                OUTSIDE LABS:  CBC:   Lab Results   Component Value Date    WBC 9.4 05/06/2019    WBC 6.4 08/19/2018    HGB 10.7 (L) 10/07/2019    HGB 13.4 05/06/2019    HCT 40.4 05/06/2019    HCT 39.7 08/19/2018     05/06/2019     08/19/2018     BMP:   Lab Results   Component Value Date     05/06/2019     08/19/2018    POTASSIUM 4.0 05/06/2019    POTASSIUM 3.8 08/19/2018    CHLORIDE 113 (H) 05/06/2019    CHLORIDE 109 08/19/2018    CO2 22 05/06/2019    CO2 25 08/19/2018    BUN 20 05/06/2019    BUN 12 08/19/2018    CR 0.53 05/06/2019    CR 0.68 08/19/2018     (H) 05/06/2019    GLC 91 08/19/2018     COAGS: No results found for: PTT, INR, FIBR  POC:   Lab Results   Component Value Date    HCG Negative 10/07/2019     HEPATIC:   Lab Results   Component Value Date    ALBUMIN 3.7 05/06/2019    PROTTOTAL 6.8 05/06/2019    ALT 18 05/06/2019    AST 12 05/06/2019    ALKPHOS 57 05/06/2019    BILITOTAL 0.5 05/06/2019     OTHER:   Lab Results   Component Value  Date    JUANJOSE 7.7 (L) 05/06/2019    LIPASE 100 05/06/2019       Anesthesia Plan    ASA Status:  2      Anesthesia Type: MAC.     - Reason for MAC: straight local not clinically adequate   Induction: Intravenous.   Maintenance: Balanced.        Consents    Anesthesia Plan(s) and associated risks, benefits, and realistic alternatives discussed. Questions answered and patient/representative(s) expressed understanding.    - Discussed:     - Discussed with:  Patient         Postoperative Care    Pain management: IV analgesics, Multi-modal analgesia.   PONV prophylaxis: Ondansetron (or other 5HT-3), Dexamethasone or Solumedrol     Comments:                Jordy Helm MD

## 2022-01-14 NOTE — OR NURSING
Notified Dr Garcia pt has 2 medications discharge pharmacy unable to fill.  Ok to substitute miralax for metamucil and rectal hydrocortizone cream instead of proctofoam.

## 2022-01-14 NOTE — DISCHARGE INSTRUCTIONS
Take miralax once daily for constipation.   Rectal cream once daily and as needed for comfort.      Same Day Surgery Discharge Instructions for  Sedation and General Anesthesia       It's not unusual to feel dizzy, light-headed or faint for up to 24 hours after surgery or while taking pain medication.  If you have these symptoms: sit for a few minutes before standing and have someone assist you when you get up to walk or use the bathroom.      You should rest and relax for the next 24 hours. We recommend you make arrangements to have an adult stay with you for at least 24 hours after your discharge.  Avoid hazardous and strenuous activity.      DO NOT DRIVE any vehicle or operate mechanical equipment for 24 hours following the end of your surgery.  Even though you may feel normal, your reactions may be affected by the medication you have received.      Do not drink alcoholic beverages for 24 hours following surgery.       Slowly progress to your regular diet as you feel able. It's not unusual to feel nauseated and/or vomit after receiving anesthesia.  If you develop these symptoms, drink clear liquids (apple juice, ginger ale, broth, 7-up, etc. ) until you feel better.  If your nausea and vomiting persists for 24 hours, please notify your surgeon.        All narcotic pain medications, along with inactivity and anesthesia, can cause constipation. Drinking plenty of liquids and increasing fiber intake will help.      For any questions of a medical nature, call your surgeon.      Do not make important decisions for 24 hours.      If you had general anesthesia, you may have a sore throat for a couple of days related to the breathing tube used during surgery.  You may use Cepacol lozenges to help with this discomfort.  If it worsens or if you develop a fever, contact your surgeon.       If you feel your pain is not well managed with the pain medications prescribed by your surgeon, please contact your surgeon's office to  let them know so they can address your concerns.       CoVid 19 Information    We want to give you information regarding Covid. Please consult your primary care provider with any questions you might have.     Patient who have symptoms (cough, fever, or shortness of breath), need to isolate for 7 days from when symptoms started OR 72 hours after fever resolves (without fever reducing medications) AND improvement of respiratory symptoms (whichever is longer).      Isolate yourself at home (in own room/own bathroom if possible)    Do Not allow any visitors    Do Not go to work or school    Do Not go to [employer],  centers, shopping, or other public places.    Do Not shake hands.    Avoid close and intimate contact with others (hugging, kissing).    Follow CDC recommendations for household cleaning of frequently touched services.     After the initial 7 days, continue to isolate yourself from household members as much as possible. To continue decrease the risk of community spread and exposure, you and any members of your household should limit activities in public for 14 days after starting home isolation.     You can reference the following CDC link for helpful home isolation/care tips:  https://www.cdc.gov/coronavirus/2019-ncov/downloads/10Things.pdf    Protect Others:    Cover Your Mouth and Nose with a mask, disposable tissue or wash cloth to avoid spreading germs to others.    Wash your hands and face frequently with soap and water    Call Your Primary Doctor If: Breathing difficulty develops or you become worse.    For more information about COVID19 and options for caring for yourself at home, please visit the CDC website at https://www.cdc.gov/coronavirus/2019-ncov/about/steps-when-sick.html  For more options for care at [facility], please visit our website at [email or web address]      Today you were given 975 mg of Tylenol at 645 am. The recommended daily maximum dose is  4000 mg.

## 2022-01-14 NOTE — OP NOTE
Operative REPORT    DATE OF PROCEDURE: 01/14/2021    PRE-OPERATIVE DIAGNOSES:  1.  Third degree internal hemorrhoids   2.  Chronic constipation    POST-OPERATIVE DIAGNOSES:  1.  Third degree internal hemorrhoids     PROCEDURE:   1.  Internal hemorrhoids banding x 3  2.  Perianal block    SURGEON: Rina Garcia M.D.    ASSISTANT: DARRION Bledsoe    ANESTHESIA:   Monitored anesthesia care and local anesthesia    CONDITION: Stable    ESTIMATED BLOOD LOSS: 1 mL     MATERIALS: Hemorrhoidal rubber bands x 3    DRAINS: None    HEMOSTASIS: Good    COMPLICATIONS: None     INDICATIONS OF THE PROCEDURE: Brandi Powers is a 46-year-old female with third degree internal hemorrhoids and anal skin tag at 6 o clock.    Patient has a past medical history significant for chronic constipation and hemorrhoids otherwise she is healthy.  She denies any other colonic symptom. Diagnostic colonoscopy performed a month ago showed third-degree internal hemorrhoids and very large tortuous colon. Patient had complained of rectal mass that she could palpate in her prolapsing hemorrhoids. It seems that the colonic mucosa was chronically inflamed otherwise it looked normal with no ulceration or any changes. Hemorrhoidal banding with possible hemorrhoidectomy was recommended. Patient agreed to proceed.     DESCRIPTION OF THE PROCEDURE: After obtaining informed consent from patient, she was taken to the operating room. Monitored anesthesia care was provided. SCDs were placed for DVT prophylaxis. Antibiotics were administered per routine. Then, she was repositioned to lithotomy position exposing his anal region using stirrups for lower extremities. The perineal region was prepped and draped in the usual sterile fashion. A preoperative timeout was performed.  Perianal block was performed at 3, 6, 9 and 12 o'clock positions in the subcutaneous plane. It was used a combination of Bupivacaine and Lidocaine with epinephrine for post-operative analgesia and avoid  narcotics use.  Inspection of anal region showed no external hemorrhoids. Anus was carefully dilated digitally. Anoscopy was performed first to examine the anal canal. The examination showed 3 internal hemorrhoids: left lateral right anterior and right posterior. The right posterior internal hemorrhoid was very fragile and bled a little after placing the speculum. The internal sphincter had good tone and it was palpable. All right anterior, right posterior and left lateral internal hemorrhoids were banded successfully. She did not have external hemorrhoids. We had good hemostasis at the end of procedure. Anal canal was examined and there was patency of anal canal with good tone. Patient tolerated procedure well and was transferred to recovery room in stable condition.    PLAN: Patient will have Senokot daily for 5 days, Colace 100mg twice daily for 2 weeks, Metamucil 1 teaspoon with 8 onz water Q AM, Milk of Magnesium if no BM by day 4 after surgery. Repeat and double dose if still no BM. Follow-up in [facility] clinic in 3 days.         Dimple Garcia MD   General Surgeon  [email or web address]  [phone] (cell text/call with any questions or concerns)  [facility] [address] [address]  Office Phone: [phone]  Fax: [phone]

## 2022-01-14 NOTE — ANESTHESIA POSTPROCEDURE EVALUATION
Patient: Brandi Powers    Procedure: Procedure(s):  LIGATION, HEMORRHOID, INTERNAL       Diagnosis:Third degree hemorrhoids [K64.2]  Diagnosis Additional Information: No value filed.    Anesthesia Type:  MAC    Note:  Disposition: Outpatient   Postop Pain Control: Uneventful            Sign Out: Well controlled pain   PONV: No   Neuro/Psych: Uneventful            Sign Out: Acceptable/Baseline neuro status   Airway/Respiratory: Uneventful            Sign Out: Acceptable/Baseline resp. status   CV/Hemodynamics: Uneventful            Sign Out: Acceptable CV status   Other NRE: NONE   DID A NON-ROUTINE EVENT OCCUR? No           Last vitals:  Vitals Value Taken Time   /59 01/14/22 0930   Temp 36.7  C (98  F) 01/14/22 0930   Pulse 71 01/14/22 0934   Resp 15 01/14/22 0934   SpO2 100 % 01/14/22 0935   Vitals shown include unvalidated device data.    Electronically Signed By: Joryd Helm MD  January 14, 2022  9:55 AM

## 2022-10-29 PROCEDURE — 99284 EMERGENCY DEPT VISIT MOD MDM: CPT

## 2022-10-30 ENCOUNTER — HOSPITAL ENCOUNTER (EMERGENCY)
Facility: CLINIC | Age: 47
Discharge: HOME | End: 2022-10-30
Attending: EMERGENCY MEDICINE | Admitting: EMERGENCY MEDICINE
Payer: COMMERCIAL

## 2022-10-30 VITALS
TEMPERATURE: 98.4 F | SYSTOLIC BLOOD PRESSURE: 142 MMHG | DIASTOLIC BLOOD PRESSURE: 96 MMHG | OXYGEN SATURATION: 100 % | RESPIRATION RATE: 18 BRPM | HEART RATE: 92 BPM

## 2022-10-30 DIAGNOSIS — M26.609 TMJ (TEMPOROMANDIBULAR JOINT SYNDROME): ICD-10-CM

## 2022-10-30 DIAGNOSIS — M54.10 RADICULOPATHY, UNSPECIFIED SPINAL REGION: ICD-10-CM

## 2022-10-30 PROCEDURE — 250N000013 HC RX MED GY IP 250 OP 250 PS 637: Performed by: EMERGENCY MEDICINE

## 2022-10-30 RX ORDER — IBUPROFEN 600 MG/1
600 TABLET, FILM COATED ORAL ONCE
Status: COMPLETED | OUTPATIENT
Start: 2022-10-30 | End: 2022-10-30

## 2022-10-30 RX ORDER — CYCLOBENZAPRINE HCL 10 MG
10 TABLET ORAL 3 TIMES DAILY PRN
Qty: 20 TABLET | Refills: 0 | Status: SHIPPED | OUTPATIENT
Start: 2022-10-30 | End: 2023-01-24

## 2022-10-30 RX ORDER — DIAZEPAM 5 MG
5 TABLET ORAL ONCE
Status: COMPLETED | OUTPATIENT
Start: 2022-10-30 | End: 2022-10-30

## 2022-10-30 RX ORDER — METHYLPREDNISOLONE 4 MG
4 TABLET, DOSE PACK ORAL SEE ADMIN INSTRUCTIONS
Qty: 21 TABLET | Refills: 0 | Status: SHIPPED | OUTPATIENT
Start: 2022-10-30 | End: 2023-01-24

## 2022-10-30 RX ORDER — OXYCODONE AND ACETAMINOPHEN 5; 325 MG/1; MG/1
1 TABLET ORAL ONCE
Status: COMPLETED | OUTPATIENT
Start: 2022-10-30 | End: 2022-10-30

## 2022-10-30 RX ADMIN — OXYCODONE HYDROCHLORIDE AND ACETAMINOPHEN 1 TABLET: 5; 325 TABLET ORAL at 01:00

## 2022-10-30 RX ADMIN — IBUPROFEN 600 MG: 600 TABLET ORAL at 00:59

## 2022-10-30 RX ADMIN — DIAZEPAM 5 MG: 5 TABLET ORAL at 01:00

## 2022-10-30 ASSESSMENT — ENCOUNTER SYMPTOMS: MYALGIAS: 1

## 2022-10-30 ASSESSMENT — ACTIVITIES OF DAILY LIVING (ADL): ADLS_ACUITY_SCORE: 35

## 2022-10-30 NOTE — ED PROVIDER NOTES
History   Chief Complaint:  Arm Pain       A  was used (Czech).      Brandi Powers is a 47 year old female who presents with right upper arm pain for several months without inciting incident or injury. She has been seen for this pain before and was prescribed pain medications which have not resolved her pain. She presents today due to her pain becoming to severe to wait for her next appointment. Her arm pain is worse with movement and she denies weakness or numbness in her arms. She also notes right ear pain today which is worse with opening of her jaw.     Review of Systems   HENT: Positive for ear pain.    Musculoskeletal: Positive for myalgias.   All other systems reviewed and are negative.    Allergies:  No known drug allergies     Medications:  Zyrtec  Benadryl  Gabapentin     Past Medical History:     Endometrial polyp    Past Surgical History:    Cholecystectomy  Cystoscopy with bilateral retorgrades  D&C   hemorrhoidectomy  Hysterectomy  Bilateral salpingo oophorectomy  IUD placed  Tubal ligation      Family History:    Mother: hypertension, cataracts, depression, diabetes, heart disease, hypercholesteremia     Social History:  The patient presents to the ED with   PCP: [facility], [place]    Physical Exam     Patient Vitals for the past 24 hrs:   BP Temp Pulse Resp SpO2   10/29/22 2349 115/64 98.4  F (36.9  C) 92 16 100 %       Physical Exam  Constitutional:  Oriented to person, place, and time. Well appearing  HENT:    TMs are clear.  Head:    Normocephalic.   Mouth/Throat:   Oropharynx is clear and moist. No intraoral concerns. Positive right TMJ tenderness.  Eyes:    EOM are normal. Pupils are equal, round, and reactive to light.   Neck:    Neck supple.   Musculoskeletal:  Normal range of motion. No spinal tenderness. Right cervical and trapezius paraspinal tenderness. 2 plus distal pulses.   Neurological:   Alert and oriented to person, place, and time.            Moves all 4 extremities spontaneously    5/5 strength in all 4 extremities.  Sensation intact to light touch in all 4 extremities.   Skin:    No rash noted. No pallor.  No swelling, warmth, or tenderness noted to right upper extremity.    Emergency Department Course       Emergency Department Course:  Reviewed:  I reviewed nursing notes, vitals, past medical history and Care Everywhere    Assessments:  0038 I obtained history and examined the patient as noted above.     Interventions    Medications   ibuprofen (ADVIL/MOTRIN) tablet 600 mg (600 mg Oral Given 10/30/22 0059)   diazepam (VALIUM) tablet 5 mg (5 mg Oral Given 10/30/22 0100)   oxyCODONE-acetaminophen (PERCOCET) 5-325 MG per tablet 1 tablet (1 tablet Oral Given 10/30/22 0100)           Disposition:  The patient was discharged to home.     Impression & Plan   Medical Decision Making:      Brandi Powers is a 47 year old female who presents for evaluation of  jaw pain. This is consistent by history and physical exam with TMJ syndrome.  Discussed treatment of this and need to see dentist and/or physical therapy. Soft diet until then.  Doubt other serious underlying etiologies for her facial pain.     In regards to her neck pain.  This clearly muscular in nature does radiate to her arm.  Pain is worsened with movement.  She denies any weakness or numbness.  He has no swelling no warmth and pain is not reproducible in her arm.  I would doubt DVT.  She is currently getting outpatient work-up for radiculopathy with and has an appointment on Monday which I did discuss close follow-up with.  I see no reason for imaging here.  She be discharged home with a course of Flexeril told can ibuprofen follow-up with primary doctor and to return for any worsening symptoms or concerns    Diagnosis:    ICD-10-CM    1. TMJ (temporomandibular joint syndrome)  M26.609       2. Radiculopathy, unspecified spinal region  M54.10           Discharge Medications:  Discharge  Medication List as of 10/30/2022  1:03 AM      START taking these medications    Details   cyclobenzaprine (FLEXERIL) 10 MG tablet Take 1 tablet (10 mg) by mouth 3 times daily as needed, Disp-20 tablet, R-0, E-Prescribe      methylPREDNISolone (MEDROL DOSEPAK) 4 MG tablet therapy pack Take 1 tablet (4 mg) by mouth See Admin Instructions, Disp-21 tablet, R-0, E-Prescribe             Scribe Disclosure:  I, Noah Kenny, am serving as a scribe at 12:24 AM on 10/30/2022 to document services personally performed by Fitz Abrams MD based on my observations and the provider's statements to me.         Fitz Abrams MD  10/30/22 0529

## 2022-10-30 NOTE — ED TRIAGE NOTES
Pt presents with right arm pain and right ear pain that started a couple months ago. Pt has been seen for it and prescribed pain pills but she states the pain does not improve. She has another appointment Monday but states the pain is just too bad for her to wait. She denies trauma or injury to the arm stating pain just started up one day.      Triage Assessment     Row Name 10/29/22 0010       Triage Assessment (Adult)    Airway WDL WDL       Respiratory WDL    Respiratory WDL WDL       Skin Circulation/Temperature WDL    Skin Circulation/Temperature WDL WDL       Cardiac WDL    Cardiac WDL WDL       Peripheral/Neurovascular WDL    Peripheral Neurovascular WDL WDL       Cognitive/Neuro/Behavioral WDL    Cognitive/Neuro/Behavioral WDL WDL

## 2022-11-30 ENCOUNTER — TRANSFERRED RECORDS (OUTPATIENT)
Dept: SURGERY | Facility: CLINIC | Age: 47
End: 2022-11-30

## 2023-01-18 ENCOUNTER — TRANSFERRED RECORDS (OUTPATIENT)
Dept: SURGERY | Facility: CLINIC | Age: 48
End: 2023-01-18
Payer: COMMERCIAL

## 2023-01-24 ENCOUNTER — TELEPHONE (OUTPATIENT)
Dept: SURGERY | Facility: CLINIC | Age: 48
End: 2023-01-24

## 2023-01-24 ENCOUNTER — OFFICE VISIT (OUTPATIENT)
Dept: SURGERY | Facility: CLINIC | Age: 48
End: 2023-01-24
Payer: COMMERCIAL

## 2023-01-24 VITALS
SYSTOLIC BLOOD PRESSURE: 115 MMHG | OXYGEN SATURATION: 98 % | DIASTOLIC BLOOD PRESSURE: 70 MMHG | HEART RATE: 70 BPM | BODY MASS INDEX: 24.41 KG/M2 | WEIGHT: 125 LBS

## 2023-01-24 DIAGNOSIS — N63.21 MASS OF UPPER OUTER QUADRANT OF LEFT BREAST: Primary | ICD-10-CM

## 2023-01-24 PROCEDURE — 99204 OFFICE O/P NEW MOD 45 MIN: CPT | Performed by: SURGERY

## 2023-01-24 NOTE — PROGRESS NOTES
Breast Patients    BREAST PATIENTS (ALL)    1-Do you have any of the following symptoms? Breast Pain and Lump(s) or Mass(es)  2-In which breast are you having the symptoms? left  3-Have you had a Mammogram? Other Location:  [facility]     -  Date:  10/2022  4-Have you ever had a breast cyst drained? No  5-Have you ever had a breast biopsy? Yes:  Left   -   Date:  11/2022  6-Have you ever had Breast Cancer? No   7-Is there a history of Breast Cancer in your family? No  8-Have you ever had Ovarian Cancer? No  9-Is there a history of Ovarian Cancer in your family? No  10-Is there a history of Colon Cancer in your family?  No  11-Is there a history of Uterine Cancer in your family?  No  12-Any known genetic abnormalities in your family?  No  10-Summarize your caffeine intake (i.e. coffee, tea, chocolate, soda etc.): 1 cup coffee a day     BREAST PATIENTS (FEMALE)    14-What age did your periods begin? 10 yr old  15-Date your last menstrual period began? hysterectomy  16-Number of full-term pregnancies: 3  17-Your age when your first child was born? 20 yrs old  18-Did you nurse your children? Yes  19-Are you pregnant now? No  20-Have you begun menopause? No  21-Have you had either ovary removed?No  22-Do you have breast implants? No   23-Do you use hormone replacement therapy?  No  24-Have you taken oral contraceptive pills?    25-Have you had an intrauterine device (IUD) placed?  Yes, For how many years?  4 yrs   26-What is your current bra size?  36C

## 2023-01-24 NOTE — LETTER
[facility]    [address] [facility], [address]  [place], [place] [address]  Phone [phone]  Fax [phone] [address], [address]  [facility] [place] [address]  Phone [phone]  Fax [phone]    [email or web address]   Showering Before Surgery      Your surgeon has asked you to take 2 showers before surgery.    Why is this important?  It is normal for bacteria (germs) to be on your skin. The skin protects us from these germs. When you have surgery, we cut the skin. Sometimes germs get into the cuts and cause infection (illness caused by germs). By following the instructions below and using special soap, you will lower the number of germs on your skin. This decreases your chance of infection.  Special soap  Buy or get 8 ounces of antiseptic surgical soap called 4% CHG. Common name brands of this soap are Hibiclens and Exidine.  You can find it at your local pharmacy, clinic or retail store. If you have trouble, ask your pharmacist to help you find the right substitute.  A note about shaving:  Do not shave within 12 inches of your incision (surgical cut) area for at least 3 days before surgery. Shaving can make small cuts in the skin. This puts you at a higher risk of infection.  Items you will need for each shower:   1 newly washed towel   4 ounces of one of the above soaps   Clean pajamas or clothes to change into    Follow these instructions:  Follow these steps the evening before surgery and the morning of surgery.  1. Wash your hair and body with your regular shampoo and soap. Make sure you rinse the shampoo and soap from your hair and body.  2. Using clean hands, apply about 2 ounces of soap gently on your skin from your ear lobes to your toes. Use on your groin area last. Do not use this soap on your face or head. If you get any soap in your eyes, ears or mouth, rinse right away.  3. Repeat step 2. It is very important to let the  soap stay on your skin for at least 1 minute.    4. Rinse well and dry off using a clean towel.    If you feel any tingling, itching or other irritation, rinse right away. It is normal to feel some coolness on the skin after using the antiseptic soap. Your skin may feel a bit dry after the shower, but do not use any lotions, creams or moisturizers. Do not use hair spray or other products in your hair.  5.  Dress in freshly washed clothes or pajamas. Use fresh pillowcases and sheets on your bed.    Repeat these steps the morning of surgery.  If you have any questions about showering or an allergy to CHG soap, please call your surgery center.

## 2023-01-24 NOTE — LETTER
[facility]    [address] [facility], [address]  [place], [place] [address]  Phone [phone]  Fax [phone]    [address], [address]  [place] [address]  Phone [phone]  Fax [phone]    [email or web address]   2023     Brandi Powers    RE: [id number]  : 1975    Brandi Powers has been scheduled for surgery on 2/15/2023  at 12:15 PM  at [facility] with Dr Jessika Epps.  The hospital is located at [address] in [place].      Please check in at the Surgery reception desk at 10:15 AM . This is located in the back of the hospital on the East side, just past the Emergency Room entrance.       DO NOT EAT OR DRINK ANYTHING 8 HOURS BEFORE YOUR ARRIVAL TIME.   You may have sips of clear liquids up until 2 hours before your arrival time. If you have been advised to take your medication, please do this early in the morning with just sips of clear liquid.        Hospital regulations require an updated pre-operative examination to be completed within 30 days of the procedure. This can be done by your primary care provider. Please ask them to fax documentation to [phone]. We also recommend you bring a copy with you.       You should shower before your surgery with Hibiclens or Exidine soap.  This can be found at your local pharmacy or you can pick it up from our office for free.  Please call our office if you have any questions.       You will be required to have an Adult (friend or family member) drive you home after your surgery and arrange for an adult to stay with you until the next morning.       You will receive several calls from our staff 3-7 days prior to your scheduled procedure with further details and to answer any questions you may have.      It is sometimes necessary to adjust the surgery schedule due to emergencies and additions to the schedule.  If your surgery is  affected by this, we greatly appreciate your flexibility and understanding in this matter      It is best if you call regarding post-operative questions between the hours of 8:00 am & 3:00 pm Monday-Friday, so you have access to the daytime care team that know you best.  ? Prescription refills are accepted during regular office hours only.      Please do not bring any Disability or FMLA papers to the hospital.  They need to be either faxed ([phone]), mailed or hand delivered to our office by you or a family member for completion.  ? Please allow 14 business days to complete paperwork.    If you have questions or concerns, please contact our office at [phone].

## 2023-01-24 NOTE — LETTER
[facility]    [address] [facility], [address]  [place], [place] [address]  Phone [phone]  Fax [phone]    [address], [address]  [place] [address]  Phone [phone]  Fax [phone]    [email or web address]     01/24/23      This is to certify that Brandi Powers, 1975        Has been under my care for: SCHEDULED SURGERY ON 2/15/2023       Patient is able to return to work/school without restrictions as of 2/17/2023    Patient is unable to return to work/school at all, from 2/15/2023 to 2/16/2023          Signed,      Jessika Epps MD

## 2023-01-24 NOTE — TELEPHONE ENCOUNTER
Type of surgery: BREAST BIOPSY   Location of surgery: [facility]  Date and time of surgery: 2/15/2023 @ 12:15 PM   Surgeon: Jessika Epps MD   Pre-Op Appt Date: PATIENT TO SCHEDULE    Post-Op Appt Date: PATIENT TO SCHEDULE     Packet sent out: Yes  Pre-cert/Authorization completed:  Not Applicable  Date: 1/24/2023         BREAST BIOPSY    MAC PT INST TO HAVE H&P WITH PCP 45 MIN REQ PA ASSIST MGB NMS

## 2023-01-25 NOTE — PROGRESS NOTES
[facility]       Assessment:    Brandi Powers is seen in consultation for a left breast mass, at the request of [facility].    Brandi is a 47 year old female with a left breast mass at  12:00 and 3 cm from the nipple.  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  Differential includes a fibroadenoma or possibly a phyllodes tumor.  Excision was recommended.     PLAN:  We have discussed excision of this ~2 cm fibroepithelial lesion.  Clinically it is mobile to skin and muscle and is easily palpable.  Differential includes a fibroadenoma or possibly phyllodes tumor, so a discussed that resection is required to make a definitive diagnosis.    Since the mass is palpable, we will not need preoperative localization with a radiofrequency seed.     We have discussed the indication, alternatives, risks and expected recovery.  Specifically, we have discussed incision, scarring, breast deformity, volume loss, postoperative infection, anesthesia, postoperative restrictions and physical limitations.  We have discussed the recommended interventions and treatments for these complications.  All questions have been answered to the best of my ability.    Brandi elects excision.    We will schedule surgery at the patient's convenience.      HPI:  Brandi Powers is a 47 year old female who was found to have a left breast mass on screening mammogram in November 2022.  She underwent subsequent ultrasound and ultrasound-guided biopsy which showed a fibroepithelial lesion.  Prior to her mammogram, she had no symptoms of breast tenderness, nipple drainage, skin changes.  Following her mammogram and biopsy, she has noted some tenderness over the area of the mass.  She does not believe the mass has grown over the past 2 months since its discovery.  It is approximately 2 cm and ovoid.  She has no history of previous breast masses.  No previous cyst aspirations or biopsies.    Hormonal history:    First  "menstrual period: 10 years old  First live birth: 20 years old  Pre menopausal, but she has had a hysterectomy  HRT No  Fertility treatment: No    Family History:  Family history of breast cancer: No  Family history of ovarian cancer: No    Work up to date:  Imaging:  Mammogram:   Diagnostic mammogram performed 11/2/2022 shows heterogenous dense glandular tissue.  An 18 x 14 mm nodular density superior to the left central breast approximately 3 cm from the nipple.    Ultrasound:   Ultrasound performed 11/2/2022 showed an oval-shaped 19 x 13 x 9 mm hypoechoic lesion at 12:00 and 3 cm from the nipple in the left breast.    Prior mammograms were reviewed and there was no mass seen on the 2021 or prior mammograms.    Percutaneous core needle biopsy: Fibroepithelial lesion      Past Medical History:  Past Medical History:   Diagnosis Date     Constipation      Hemorrhoids, unspecified hemorrhoid type        Past Surgical History:  Past Surgical History:   Procedure Laterality Date     BLADDER SURGERY      \" pain in my bladder fixed\"     CHOLECYSTECTOMY       COLONOSCOPY N/A 12/17/2021    Procedure: COLONOSCOPY;  Surgeon: Rina Garcia MD;  Location:  GI     CYSTOSCOPY, RETROGRADES, COMBINED Bilateral 10/07/2019    Procedure: Cystoscopy with bilateral retrogrades;  Surgeon: Nelia Armijo MD;  Location: [facility] OR     DILATION AND CURETTAGE, OPERATIVE HYSTEROSCOPY WITH MORCELLATOR, COMBINED N/A 04/09/2019    Procedure: Hysteroscopy, dilation and curettage, polypectomy with morcellator;  Surgeon: Nelia Armijo MD;  Location: [facility] OR     HEMORRHOIDECTOMY INTERNAL LIGATION N/A 1/14/2022    Procedure: LIGATION, HEMORRHOID, INTERNAL;  Surgeon: Rina Garcia MD;  Location:  OR     HYSTERECTOMY VAGINAL, BILATERAL SALPINGO-OOPHERECTOMY, COMBINED Left 10/07/2019    Procedure: Total vaginal hysterectomy, partial left salpingectomy, cystoscopy with bilateral retrogrades;  Surgeon: Nelia Armijo, " MD;  Location: [facility] OR     IUD removal, laparoscopy, bilateral tubal ligation, excision of left ovarian mass probable fibroma  06/2014     LAPAROSCOPIC CYSTECTOMY OVARIAN (BENIGN)  06/10/2014    Procedure: LAPAROSCOPIC CYSTECTOMY OVARIAN (BENIGN);  Surgeon: Sam Ferrari MD;  Location: [facility] OR     LAPAROSCOPIC TUBAL LIGATION  06/10/2014    Procedure: LAPAROSCOPIC TUBAL LIGATION;  Surgeon: Sam Ferrari MD;  Location: [facility] OR     REMOVE INTRAUTERINE DEVICE  06/10/2014    Procedure: REMOVE INTRAUTERINE DEVICE;  Surgeon: Sam Ferrari MD;  Location: [facility] OR        Social History:  Social History     Tobacco Use     Smoking status: Never     Smokeless tobacco: Never   Substance Use Topics     Alcohol use: No     Drug use: No     ROS:  The 10 point review of systems is negative other than noted in the HPI and/or below.      PE:  Vitals: /70   Pulse 70   Wt 56.7 kg (125 lb)   LMP 06/28/2019 (Approximate)   SpO2 98%   BMI 24.41 kg/m    BMI: Body mass index is 24.41 kg/m .  General appearance: well-nourished, sitting comfortably, no apparent distress  Neck: Supple, without masses or lymphadenopathy   Respirations:  Unlabored  CV: Regular pulse  Extremities: Without edema  Neurologic: alert, speech is clear, moves all extremities with good strength  Psychiatric: Mood and affect are appropriate  Skin: Without lesions, rashes, or jaundice  Breast:    Symmetrical with no skin or nipple.    Contour is normal.   Parenchyma is mildly dense   Masses- left, 12 o'clock - 2 cm diameter   Ecchymosis- none   Incisional scar- none    Lymph:      No supraclavicular/infraclavicular adenopathy.    Axillary adenopathy: none      This note may have been created using voice recognition software. Undetected word substitutions or other errors may have occurred.     Time spent with the patient with greater that 50% of the time in discussion was 35 minutes.     Jessika Epps MD    Please route or send letter  to:  Referring Provider

## 2023-02-09 ENCOUNTER — APPOINTMENT (OUTPATIENT)
Dept: INTERPRETER SERVICES | Facility: CLINIC | Age: 48
End: 2023-02-09
Payer: COMMERCIAL

## 2023-02-15 ENCOUNTER — ANESTHESIA (OUTPATIENT)
Dept: SURGERY | Facility: CLINIC | Age: 48
End: 2023-02-15
Payer: COMMERCIAL

## 2023-02-15 ENCOUNTER — APPOINTMENT (OUTPATIENT)
Dept: SURGERY | Facility: PHYSICIAN GROUP | Age: 48
End: 2023-02-15
Payer: COMMERCIAL

## 2023-02-15 ENCOUNTER — HOSPITAL ENCOUNTER (OUTPATIENT)
Facility: CLINIC | Age: 48
Discharge: HOME | End: 2023-02-15
Attending: SURGERY | Admitting: SURGERY
Payer: COMMERCIAL

## 2023-02-15 ENCOUNTER — ANESTHESIA EVENT (OUTPATIENT)
Dept: SURGERY | Facility: CLINIC | Age: 48
End: 2023-02-15
Payer: COMMERCIAL

## 2023-02-15 VITALS
OXYGEN SATURATION: 100 % | WEIGHT: 130 LBS | DIASTOLIC BLOOD PRESSURE: 75 MMHG | BODY MASS INDEX: 27.29 KG/M2 | HEIGHT: 58 IN | HEART RATE: 52 BPM | RESPIRATION RATE: 16 BRPM | SYSTOLIC BLOOD PRESSURE: 125 MMHG | TEMPERATURE: 96.9 F

## 2023-02-15 DIAGNOSIS — N63.21 MASS OF UPPER OUTER QUADRANT OF LEFT BREAST: Primary | ICD-10-CM

## 2023-02-15 PROCEDURE — 272N000001 HC OR GENERAL SUPPLY STERILE: Performed by: SURGERY

## 2023-02-15 PROCEDURE — 360N000074 HC SURGERY LEVEL 1, PER MIN: Performed by: SURGERY

## 2023-02-15 PROCEDURE — 999N000141 HC STATISTIC PRE-PROCEDURE NURSING ASSESSMENT: Performed by: SURGERY

## 2023-02-15 PROCEDURE — 88307 TISSUE EXAM BY PATHOLOGIST: CPT | Mod: TC | Performed by: SURGERY

## 2023-02-15 PROCEDURE — 19125 EXCISION BREAST LESION: CPT | Mod: LT | Performed by: SURGERY

## 2023-02-15 PROCEDURE — 250N000011 HC RX IP 250 OP 636: Performed by: NURSE ANESTHETIST, CERTIFIED REGISTERED

## 2023-02-15 PROCEDURE — 370N000017 HC ANESTHESIA TECHNICAL FEE, PER MIN: Performed by: SURGERY

## 2023-02-15 PROCEDURE — 88307 TISSUE EXAM BY PATHOLOGIST: CPT | Mod: 26 | Performed by: PATHOLOGY

## 2023-02-15 PROCEDURE — 258N000003 HC RX IP 258 OP 636: Performed by: NURSE ANESTHETIST, CERTIFIED REGISTERED

## 2023-02-15 PROCEDURE — 250N000011 HC RX IP 250 OP 636: Performed by: SURGERY

## 2023-02-15 PROCEDURE — 250N000009 HC RX 250: Performed by: SURGERY

## 2023-02-15 PROCEDURE — 710N000012 HC RECOVERY PHASE 2, PER MINUTE: Performed by: SURGERY

## 2023-02-15 RX ORDER — FENTANYL CITRATE 50 UG/ML
50 INJECTION, SOLUTION INTRAMUSCULAR; INTRAVENOUS EVERY 5 MIN PRN
Status: DISCONTINUED | OUTPATIENT
Start: 2023-02-15 | End: 2023-02-15 | Stop reason: HOSPADM

## 2023-02-15 RX ORDER — HYDRALAZINE HYDROCHLORIDE 20 MG/ML
2.5-5 INJECTION INTRAMUSCULAR; INTRAVENOUS EVERY 10 MIN PRN
Status: DISCONTINUED | OUTPATIENT
Start: 2023-02-15 | End: 2023-02-15 | Stop reason: HOSPADM

## 2023-02-15 RX ORDER — FENTANYL CITRATE 50 UG/ML
50 INJECTION, SOLUTION INTRAMUSCULAR; INTRAVENOUS
Status: DISCONTINUED | OUTPATIENT
Start: 2023-02-15 | End: 2023-02-15 | Stop reason: HOSPADM

## 2023-02-15 RX ORDER — FENTANYL CITRATE 50 UG/ML
25 INJECTION, SOLUTION INTRAMUSCULAR; INTRAVENOUS EVERY 5 MIN PRN
Status: DISCONTINUED | OUTPATIENT
Start: 2023-02-15 | End: 2023-02-15 | Stop reason: HOSPADM

## 2023-02-15 RX ORDER — OXYCODONE HYDROCHLORIDE 5 MG/1
5 TABLET ORAL EVERY 4 HOURS PRN
Status: DISCONTINUED | OUTPATIENT
Start: 2023-02-15 | End: 2023-02-15 | Stop reason: HOSPADM

## 2023-02-15 RX ORDER — CEFAZOLIN SODIUM/WATER 2 G/20 ML
2 SYRINGE (ML) INTRAVENOUS SEE ADMIN INSTRUCTIONS
Status: DISCONTINUED | OUTPATIENT
Start: 2023-02-15 | End: 2023-02-15 | Stop reason: HOSPADM

## 2023-02-15 RX ORDER — IBUPROFEN 200 MG
600 TABLET ORAL EVERY 6 HOURS PRN
COMMUNITY
Start: 2023-02-15

## 2023-02-15 RX ORDER — HYDROMORPHONE HCL IN WATER/PF 6 MG/30 ML
0.2 PATIENT CONTROLLED ANALGESIA SYRINGE INTRAVENOUS EVERY 5 MIN PRN
Status: DISCONTINUED | OUTPATIENT
Start: 2023-02-15 | End: 2023-02-15 | Stop reason: HOSPADM

## 2023-02-15 RX ORDER — MAGNESIUM HYDROXIDE 1200 MG/15ML
LIQUID ORAL PRN
Status: DISCONTINUED | OUTPATIENT
Start: 2023-02-15 | End: 2023-02-15 | Stop reason: HOSPADM

## 2023-02-15 RX ORDER — ACETAMINOPHEN 325 MG/1
975 TABLET ORAL ONCE
Status: DISCONTINUED | OUTPATIENT
Start: 2023-02-15 | End: 2023-02-15 | Stop reason: HOSPADM

## 2023-02-15 RX ORDER — ONDANSETRON 4 MG/1
4 TABLET, ORALLY DISINTEGRATING ORAL EVERY 30 MIN PRN
Status: DISCONTINUED | OUTPATIENT
Start: 2023-02-15 | End: 2023-02-15 | Stop reason: HOSPADM

## 2023-02-15 RX ORDER — ACETAMINOPHEN 325 MG/1
650 TABLET ORAL
Status: DISCONTINUED | OUTPATIENT
Start: 2023-02-15 | End: 2023-02-15 | Stop reason: HOSPADM

## 2023-02-15 RX ORDER — FENTANYL CITRATE 50 UG/ML
INJECTION, SOLUTION INTRAMUSCULAR; INTRAVENOUS PRN
Status: DISCONTINUED | OUTPATIENT
Start: 2023-02-15 | End: 2023-02-15

## 2023-02-15 RX ORDER — HYDROMORPHONE HCL IN WATER/PF 6 MG/30 ML
0.4 PATIENT CONTROLLED ANALGESIA SYRINGE INTRAVENOUS EVERY 5 MIN PRN
Status: DISCONTINUED | OUTPATIENT
Start: 2023-02-15 | End: 2023-02-15 | Stop reason: HOSPADM

## 2023-02-15 RX ORDER — OXYCODONE HYDROCHLORIDE 5 MG/1
5 TABLET ORAL
Status: DISCONTINUED | OUTPATIENT
Start: 2023-02-15 | End: 2023-02-15 | Stop reason: HOSPADM

## 2023-02-15 RX ORDER — SODIUM CHLORIDE, SODIUM LACTATE, POTASSIUM CHLORIDE, CALCIUM CHLORIDE 600; 310; 30; 20 MG/100ML; MG/100ML; MG/100ML; MG/100ML
INJECTION, SOLUTION INTRAVENOUS CONTINUOUS
Status: DISCONTINUED | OUTPATIENT
Start: 2023-02-15 | End: 2023-02-15 | Stop reason: HOSPADM

## 2023-02-15 RX ORDER — SODIUM CHLORIDE, SODIUM LACTATE, POTASSIUM CHLORIDE, CALCIUM CHLORIDE 600; 310; 30; 20 MG/100ML; MG/100ML; MG/100ML; MG/100ML
INJECTION, SOLUTION INTRAVENOUS CONTINUOUS PRN
Status: DISCONTINUED | OUTPATIENT
Start: 2023-02-15 | End: 2023-02-15

## 2023-02-15 RX ORDER — METOPROLOL TARTRATE 1 MG/ML
1-2 INJECTION, SOLUTION INTRAVENOUS EVERY 5 MIN PRN
Status: DISCONTINUED | OUTPATIENT
Start: 2023-02-15 | End: 2023-02-15 | Stop reason: HOSPADM

## 2023-02-15 RX ORDER — OXYCODONE HYDROCHLORIDE 5 MG/1
10 TABLET ORAL EVERY 4 HOURS PRN
Status: DISCONTINUED | OUTPATIENT
Start: 2023-02-15 | End: 2023-02-15 | Stop reason: HOSPADM

## 2023-02-15 RX ORDER — OXYCODONE HYDROCHLORIDE 5 MG/1
5-10 TABLET ORAL EVERY 4 HOURS PRN
Qty: 10 TABLET | Refills: 0 | Status: ON HOLD | OUTPATIENT
Start: 2023-02-15 | End: 2023-03-08

## 2023-02-15 RX ORDER — ONDANSETRON 2 MG/ML
4 INJECTION INTRAMUSCULAR; INTRAVENOUS EVERY 30 MIN PRN
Status: DISCONTINUED | OUTPATIENT
Start: 2023-02-15 | End: 2023-02-15 | Stop reason: HOSPADM

## 2023-02-15 RX ORDER — ACETAMINOPHEN 500 MG
1000 TABLET ORAL EVERY 6 HOURS PRN
COMMUNITY
Start: 2023-02-15

## 2023-02-15 RX ORDER — KETOROLAC TROMETHAMINE 15 MG/ML
15 INJECTION, SOLUTION INTRAMUSCULAR; INTRAVENOUS
Status: DISCONTINUED | OUTPATIENT
Start: 2023-02-15 | End: 2023-02-15 | Stop reason: HOSPADM

## 2023-02-15 RX ORDER — PROPOFOL 10 MG/ML
INJECTION, EMULSION INTRAVENOUS CONTINUOUS PRN
Status: DISCONTINUED | OUTPATIENT
Start: 2023-02-15 | End: 2023-02-15

## 2023-02-15 RX ORDER — CEFAZOLIN SODIUM/WATER 2 G/20 ML
2 SYRINGE (ML) INTRAVENOUS
Status: COMPLETED | OUTPATIENT
Start: 2023-02-15 | End: 2023-02-15

## 2023-02-15 RX ADMIN — Medication 2 G: at 08:40

## 2023-02-15 RX ADMIN — MIDAZOLAM 2 MG: 1 INJECTION INTRAMUSCULAR; INTRAVENOUS at 08:34

## 2023-02-15 RX ADMIN — PROPOFOL 75 MCG/KG/MIN: 10 INJECTION, EMULSION INTRAVENOUS at 08:38

## 2023-02-15 RX ADMIN — SODIUM CHLORIDE, POTASSIUM CHLORIDE, SODIUM LACTATE AND CALCIUM CHLORIDE: 600; 310; 30; 20 INJECTION, SOLUTION INTRAVENOUS at 08:34

## 2023-02-15 RX ADMIN — FENTANYL CITRATE 25 MCG: 50 INJECTION, SOLUTION INTRAMUSCULAR; INTRAVENOUS at 08:50

## 2023-02-15 ASSESSMENT — ACTIVITIES OF DAILY LIVING (ADL)
ADLS_ACUITY_SCORE: 35
ADLS_ACUITY_SCORE: 35

## 2023-02-15 NOTE — ANESTHESIA CARE TRANSFER NOTE
Patient: Brandi Powers    Procedure: Procedure(s):  LEFT BREAST EXCISIONAL BIOPSY       Diagnosis: Mass of upper outer quadrant of left breast [N63.21]  Diagnosis Additional Information: No value filed.    Anesthesia Type:   MAC     Note:    Oropharynx: oropharynx clear of all foreign objects  Level of Consciousness: awake  Oxygen Supplementation: room air    Independent Airway: airway patency satisfactory and stable  Dentition: dentition unchanged    Report to RN Given: handoff report given  Patient transferred to: Phase II    Handoff Report: Identifed the Patient, Identified the Reponsible Provider, Reviewed the pertinent medical history, Discussed the surgical course, Reviewed Intra-OP anesthesia mangement and issues during anesthesia, Set expectations for post-procedure period and Allowed opportunity for questions and acknowledgement of understanding      Vitals:  Vitals Value Taken Time   /58 02/15/23 0927   Temp     Pulse 57 02/15/23 0927   Resp 16 02/15/23 0927   SpO2 99 % 02/15/23 0930   Vitals shown include unvalidated device data.    Electronically Signed By: MARISOL Brown CRNA  February 15, 2023  9:31 AM

## 2023-02-15 NOTE — ANESTHESIA POSTPROCEDURE EVALUATION
Patient: Brandi Powers    Procedure: Procedure(s):  LEFT BREAST EXCISIONAL BIOPSY       Anesthesia Type:  MAC    Note:  Disposition: Outpatient   Postop Pain Control: Uneventful            Sign Out: Well controlled pain   PONV: No   Neuro/Psych: Uneventful            Sign Out: Acceptable/Baseline neuro status   Airway/Respiratory: Uneventful            Sign Out: Acceptable/Baseline resp. status   CV/Hemodynamics: Uneventful            Sign Out: Acceptable CV status; No obvious hypovolemia; No obvious fluid overload   Other NRE: NONE   DID A NON-ROUTINE EVENT OCCUR? No           Last vitals:  Vitals Value Taken Time   /75 02/15/23 1020   Temp 96.9  F (36.1  C) 02/15/23 1030   Pulse 52 02/15/23 1020   Resp 16 02/15/23 1030   SpO2 99 % 02/15/23 1026   Vitals shown include unvalidated device data.    Electronically Signed By: Bret Munoz MD  February 15, 2023  2:49 PM

## 2023-02-15 NOTE — OP NOTE
General Surgery Operative Note      Pre-operative diagnosis: Left breast mass   Post-operative diagnosis: Same   Procedure: Left excisional breast biopsy, 12 o'clock.   Surgeon: Jessika Epps MD   Assistant(s): Keke Cordoba PA-C  The Physician Assistant was medically necessary for their expertise in prepping, suctioning, suturing and retraction.   Anesthesia: Local with MAC    Estimated blood loss:   15 ml     Specimens: ID Type Source Tests Collected by Time Destination   1 : LEFT BREAST EXCISIONAL BIOPSY, short stitch superior, long stitch lateral Tissue Breast, Left SURGICAL PATHOLOGY EXAM Jessika Epps MD 2/15/2023  9:09 AM           INDICATIONS:  Left breast mass, 12 o'clock.  Percutaneous biopsy reveals a fibroadenoma vs possible phylloides tumor. We discussed excision and patient agreed to proceed.    DESCRIPTION OF PROCEDURE:  The patient was placed on the table in supine position.  Anesthetic was administered.  The Left breast was prepped and draped in standard sterile fashion.  After anesthetizing the skin we made a 3 cm incision centered at the 12 o'clock position overlying the palpable mass.  We carried the incision down into the breast tissue and excised the area of interest. This was marked with a short stitch superiorly and a long stitch laterally.  The specimen was sent for permanent pathology. Hemostasis was maintained throughout with electrocautery.  Breast tissue was gently brought together with 3-0 vicryl. The skin was closed in layers with 3-0 vicryl interrupted sutures and with running 4-0 Vicryl subcuticular suture and Dermabond.  The patient tolerated the procedure well.  Sponge and instrument counts were correct.    FINDINGS: Firm palpable lesion at 12:00 sent for permanent pathology.    Jessika Epps MD

## 2023-02-15 NOTE — ANESTHESIA PREPROCEDURE EVALUATION
"Anesthesia Pre-Procedure Evaluation    Patient: Brandi Powers   MRN: [id number] : 1975        Procedure : Procedure(s):  BIOPSY, BREAST - Left          Past Medical History:   Diagnosis Date     Constipation      Hemorrhoids, unspecified hemorrhoid type       Past Surgical History:   Procedure Laterality Date     BLADDER SURGERY      \" pain in my bladder fixed\"     CHOLECYSTECTOMY       COLONOSCOPY N/A 2021    Procedure: COLONOSCOPY;  Surgeon: Rina Garcia MD;  Location:  GI     CYSTOSCOPY, RETROGRADES, COMBINED Bilateral 10/07/2019    Procedure: Cystoscopy with bilateral retrogrades;  Surgeon: Nelia Armijo MD;  Location: [facility]     DILATION AND CURETTAGE, OPERATIVE HYSTEROSCOPY WITH MORCELLATOR, COMBINED N/A 2019    Procedure: Hysteroscopy, dilation and curettage, polypectomy with morcellator;  Surgeon: Nelia Armijo MD;  Location: [facility]     HEMORRHOIDECTOMY INTERNAL LIGATION N/A 2022    Procedure: LIGATION, HEMORRHOID, INTERNAL;  Surgeon: Rina Garcia MD;  Location:  OR     HYSTERECTOMY VAGINAL, BILATERAL SALPINGO-OOPHERECTOMY, COMBINED Left 10/07/2019    Procedure: Total vaginal hysterectomy, partial left salpingectomy, cystoscopy with bilateral retrogrades;  Surgeon: Nelia Armijo MD;  Location: [facility]     IUD removal, laparoscopy, bilateral tubal ligation, excision of left ovarian mass probable fibroma  2014     LAPAROSCOPIC CYSTECTOMY OVARIAN (BENIGN)  06/10/2014    Procedure: LAPAROSCOPIC CYSTECTOMY OVARIAN (BENIGN);  Surgeon: Sam Ferrari MD;  Location: [facility]     LAPAROSCOPIC TUBAL LIGATION  06/10/2014    Procedure: LAPAROSCOPIC TUBAL LIGATION;  Surgeon: Sam Ferrari MD;  Location: [facility]     REMOVE INTRAUTERINE DEVICE  06/10/2014    Procedure: REMOVE INTRAUTERINE DEVICE;  Surgeon: Sam Ferrari MD;  Location: [facility]      No Known Allergies   Social History     Tobacco Use     Smoking status: Never     " Smokeless tobacco: Never   Substance Use Topics     Alcohol use: No      Wt Readings from Last 1 Encounters:   02/09/23 59.1 kg (130 lb 3.2 oz)        Anesthesia Evaluation   Pt has had prior anesthetic.         ROS/MED HX  ENT/Pulmonary:  - neg pulmonary ROS     Neurologic:       Cardiovascular:  - neg cardiovascular ROS     METS/Exercise Tolerance:     Hematologic:       Musculoskeletal:       GI/Hepatic:    (-) GERD   Renal/Genitourinary:       Endo:       Psychiatric/Substance Use:       Infectious Disease:       Malignancy: Comment: Breast lump of unknown significance      Other:            Physical Exam    Airway        Mallampati: II   TM distance: > 3 FB   Neck ROM: full     Respiratory Devices and Support         Dental           Cardiovascular   cardiovascular exam normal          Pulmonary   pulmonary exam normal                OUTSIDE LABS:  CBC:   Lab Results   Component Value Date    WBC 9.4 05/06/2019    WBC 6.4 08/19/2018    HGB 10.7 (L) 10/07/2019    HGB 13.4 05/06/2019    HCT 40.4 05/06/2019    HCT 39.7 08/19/2018     05/06/2019     08/19/2018     BMP:   Lab Results   Component Value Date     05/06/2019     08/19/2018    POTASSIUM 4.0 05/06/2019    POTASSIUM 3.8 08/19/2018    CHLORIDE 113 (H) 05/06/2019    CHLORIDE 109 08/19/2018    CO2 22 05/06/2019    CO2 25 08/19/2018    BUN 20 05/06/2019    BUN 12 08/19/2018    CR 0.53 05/06/2019    CR 0.68 08/19/2018     (H) 05/06/2019    GLC 91 08/19/2018     COAGS: No results found for: PTT, INR, FIBR  POC:   Lab Results   Component Value Date    HCG Negative 10/07/2019     HEPATIC:   Lab Results   Component Value Date    ALBUMIN 3.7 05/06/2019    PROTTOTAL 6.8 05/06/2019    ALT 18 05/06/2019    AST 12 05/06/2019    ALKPHOS 57 05/06/2019    BILITOTAL 0.5 05/06/2019     OTHER:   Lab Results   Component Value Date    JUANJOSE 7.7 (L) 05/06/2019    LIPASE 100 05/06/2019       Anesthesia Plan    ASA Status:  2   NPO Status:  NPO  Appropriate    Anesthesia Type: MAC.     - Reason for MAC: straight local not clinically adequate   Induction: Intravenous.   Maintenance: TIVA.        Consents    Anesthesia Plan(s) and associated risks, benefits, and realistic alternatives discussed. Questions answered and patient/representative(s) expressed understanding.    - Discussed:     - Discussed with:  Patient         Postoperative Care    Pain management: IV analgesics, Oral pain medications, Multi-modal analgesia.   PONV prophylaxis: Ondansetron (or other 5HT-3)     Comments:                Bret Munoz MD

## 2023-02-15 NOTE — PROGRESS NOTES
SPIRITUAL HEALTH SERVICES  [facility]  PRE-SURGERY VISIT    Pre-surgical visit with pt.  Provided spiritual support, prayer with .   Oriented to Spiritual Health Services and availability for emotional/spiritual support during hospital stay.     Lester Tomas MA  Staff   *[phone]   On Site Tu/We/Th    [facility] remains available 24/7 for emergent requests/referrals, either by having the on-call  paged or by entering an ASAP/STAT consult in Epic (this will also page the on-call ). Routine Epic consults receive an initial response within 24 hours.

## 2023-02-16 ENCOUNTER — APPOINTMENT (OUTPATIENT)
Dept: INTERPRETER SERVICES | Facility: CLINIC | Age: 48
End: 2023-02-16
Payer: COMMERCIAL

## 2023-02-17 ENCOUNTER — TELEPHONE (OUTPATIENT)
Dept: SURGERY | Facility: CLINIC | Age: 48
End: 2023-02-17
Payer: COMMERCIAL

## 2023-02-17 NOTE — TELEPHONE ENCOUNTER
S/p Left excisional breast biopsy, 12 o'clock.  Procedure date: 2/15/23  Surgeon: Dr. Epps    Patient's , Didier, calling with patient.  Calling for pathology results.  (consent to communicate on file)     Informed  that pathology results are still pending.  Dr. Epps will call with results when results are final.      Patient is to follow up in clinic with Dr. Epps in two weeks.  Scheduled patient for a post-op appointment with Dr. Epps on 3/7/23.      Patient /  await call from Dr. Epps re: path results.  No further questions / needs at this time.

## 2023-02-20 LAB
PATH REPORT.COMMENTS IMP SPEC: NORMAL
PATH REPORT.FINAL DX SPEC: NORMAL
PATH REPORT.GROSS SPEC: NORMAL
PATH REPORT.MICROSCOPIC SPEC OTHER STN: NORMAL
PATH REPORT.RELEVANT HX SPEC: NORMAL
PHOTO IMAGE: NORMAL

## 2023-02-21 ENCOUNTER — PREP FOR PROCEDURE (OUTPATIENT)
Dept: SURGERY | Facility: CLINIC | Age: 48
End: 2023-02-21
Payer: COMMERCIAL

## 2023-02-21 DIAGNOSIS — D24.2 BENIGN PHYLLODES TUMOR OF LEFT BREAST: Primary | ICD-10-CM

## 2023-02-22 ENCOUNTER — TELEPHONE (OUTPATIENT)
Dept: SURGERY | Facility: CLINIC | Age: 48
End: 2023-02-22
Payer: COMMERCIAL

## 2023-02-22 NOTE — TELEPHONE ENCOUNTER
Type of surgery: RE-EXCISION LEFT BREAST EXCISIONAL BIOPSY POSTERIOR MARGIN   Location of surgery: [facility]  Date and time of surgery: 3-8-23, 10:15 AM   Surgeon: DR GUPTA   Pre-Op Appt Date: 2-7-23  Post-Op Appt Date: NA   Packet sent out: Yes  Pre-cert/Authorization completed:  Not Applicable  Date: 2-22-23    RE-EXCISION LEFT BREAST EXCISIONAL BIOPSY POSTERIOR MARGIN   GENERAL   H&P DONE 2/7/23   30 MINS REQ  PA ASSIST MGB   ALW

## 2023-03-08 ENCOUNTER — ANESTHESIA EVENT (OUTPATIENT)
Dept: SURGERY | Facility: CLINIC | Age: 48
End: 2023-03-08
Payer: COMMERCIAL

## 2023-03-08 ENCOUNTER — HOSPITAL ENCOUNTER (OUTPATIENT)
Facility: CLINIC | Age: 48
Discharge: HOME | End: 2023-03-08
Attending: SURGERY | Admitting: SURGERY
Payer: COMMERCIAL

## 2023-03-08 ENCOUNTER — APPOINTMENT (OUTPATIENT)
Dept: SURGERY | Facility: PHYSICIAN GROUP | Age: 48
End: 2023-03-08
Payer: COMMERCIAL

## 2023-03-08 ENCOUNTER — ANESTHESIA (OUTPATIENT)
Dept: SURGERY | Facility: CLINIC | Age: 48
End: 2023-03-08
Payer: COMMERCIAL

## 2023-03-08 VITALS
SYSTOLIC BLOOD PRESSURE: 116 MMHG | BODY MASS INDEX: 28.07 KG/M2 | RESPIRATION RATE: 14 BRPM | OXYGEN SATURATION: 100 % | DIASTOLIC BLOOD PRESSURE: 68 MMHG | WEIGHT: 133.7 LBS | HEART RATE: 64 BPM | TEMPERATURE: 97 F | HEIGHT: 58 IN

## 2023-03-08 DIAGNOSIS — N63.21 MASS OF UPPER OUTER QUADRANT OF LEFT BREAST: ICD-10-CM

## 2023-03-08 PROCEDURE — 250N000013 HC RX MED GY IP 250 OP 250 PS 637: Performed by: SURGERY

## 2023-03-08 PROCEDURE — 258N000003 HC RX IP 258 OP 636: Performed by: NURSE ANESTHETIST, CERTIFIED REGISTERED

## 2023-03-08 PROCEDURE — 710N000012 HC RECOVERY PHASE 2, PER MINUTE: Performed by: SURGERY

## 2023-03-08 PROCEDURE — 250N000009 HC RX 250: Performed by: SURGERY

## 2023-03-08 PROCEDURE — 370N000017 HC ANESTHESIA TECHNICAL FEE, PER MIN: Performed by: SURGERY

## 2023-03-08 PROCEDURE — 250N000011 HC RX IP 250 OP 636: Performed by: NURSE ANESTHETIST, CERTIFIED REGISTERED

## 2023-03-08 PROCEDURE — 999N000141 HC STATISTIC PRE-PROCEDURE NURSING ASSESSMENT: Performed by: SURGERY

## 2023-03-08 PROCEDURE — 19120 REMOVAL OF BREAST LESION: CPT | Mod: LT | Performed by: SURGERY

## 2023-03-08 PROCEDURE — 250N000009 HC RX 250: Performed by: NURSE ANESTHETIST, CERTIFIED REGISTERED

## 2023-03-08 PROCEDURE — 360N000074 HC SURGERY LEVEL 1, PER MIN: Performed by: SURGERY

## 2023-03-08 PROCEDURE — 250N000011 HC RX IP 250 OP 636: Performed by: SURGERY

## 2023-03-08 PROCEDURE — 88305 TISSUE EXAM BY PATHOLOGIST: CPT | Mod: 26 | Performed by: STUDENT IN AN ORGANIZED HEALTH CARE EDUCATION/TRAINING PROGRAM

## 2023-03-08 PROCEDURE — 272N000001 HC OR GENERAL SUPPLY STERILE: Performed by: SURGERY

## 2023-03-08 PROCEDURE — 88305 TISSUE EXAM BY PATHOLOGIST: CPT | Mod: TC | Performed by: SURGERY

## 2023-03-08 RX ORDER — SODIUM CHLORIDE, SODIUM LACTATE, POTASSIUM CHLORIDE, CALCIUM CHLORIDE 600; 310; 30; 20 MG/100ML; MG/100ML; MG/100ML; MG/100ML
INJECTION, SOLUTION INTRAVENOUS CONTINUOUS
Status: DISCONTINUED | OUTPATIENT
Start: 2023-03-08 | End: 2023-03-08 | Stop reason: HOSPADM

## 2023-03-08 RX ORDER — SODIUM CHLORIDE, SODIUM LACTATE, POTASSIUM CHLORIDE, CALCIUM CHLORIDE 600; 310; 30; 20 MG/100ML; MG/100ML; MG/100ML; MG/100ML
INJECTION, SOLUTION INTRAVENOUS CONTINUOUS PRN
Status: DISCONTINUED | OUTPATIENT
Start: 2023-03-08 | End: 2023-03-08

## 2023-03-08 RX ORDER — FENTANYL CITRATE 50 UG/ML
50 INJECTION, SOLUTION INTRAMUSCULAR; INTRAVENOUS EVERY 5 MIN PRN
Status: DISCONTINUED | OUTPATIENT
Start: 2023-03-08 | End: 2023-03-08 | Stop reason: HOSPADM

## 2023-03-08 RX ORDER — BUPIVACAINE HYDROCHLORIDE 5 MG/ML
INJECTION, SOLUTION EPIDURAL; INTRACAUDAL PRN
Status: DISCONTINUED | OUTPATIENT
Start: 2023-03-08 | End: 2023-03-08 | Stop reason: HOSPADM

## 2023-03-08 RX ORDER — LIDOCAINE 40 MG/G
CREAM TOPICAL
Status: DISCONTINUED | OUTPATIENT
Start: 2023-03-08 | End: 2023-03-08 | Stop reason: HOSPADM

## 2023-03-08 RX ORDER — KETOROLAC TROMETHAMINE 15 MG/ML
15 INJECTION, SOLUTION INTRAMUSCULAR; INTRAVENOUS
Status: DISCONTINUED | OUTPATIENT
Start: 2023-03-08 | End: 2023-03-08 | Stop reason: HOSPADM

## 2023-03-08 RX ORDER — CEFAZOLIN SODIUM/WATER 2 G/20 ML
2 SYRINGE (ML) INTRAVENOUS
Status: COMPLETED | OUTPATIENT
Start: 2023-03-08 | End: 2023-03-08

## 2023-03-08 RX ORDER — CEFAZOLIN SODIUM/WATER 2 G/20 ML
2 SYRINGE (ML) INTRAVENOUS SEE ADMIN INSTRUCTIONS
Status: DISCONTINUED | OUTPATIENT
Start: 2023-03-08 | End: 2023-03-08 | Stop reason: HOSPADM

## 2023-03-08 RX ORDER — OXYCODONE HYDROCHLORIDE 5 MG/1
5 TABLET ORAL ONCE
Status: COMPLETED | OUTPATIENT
Start: 2023-03-08 | End: 2023-03-08

## 2023-03-08 RX ORDER — ALBUTEROL SULFATE 0.83 MG/ML
2.5 SOLUTION RESPIRATORY (INHALATION) EVERY 4 HOURS PRN
Status: DISCONTINUED | OUTPATIENT
Start: 2023-03-08 | End: 2023-03-08 | Stop reason: HOSPADM

## 2023-03-08 RX ORDER — LABETALOL HYDROCHLORIDE 5 MG/ML
10 INJECTION, SOLUTION INTRAVENOUS
Status: DISCONTINUED | OUTPATIENT
Start: 2023-03-08 | End: 2023-03-08 | Stop reason: HOSPADM

## 2023-03-08 RX ORDER — FENTANYL CITRATE 50 UG/ML
25 INJECTION, SOLUTION INTRAMUSCULAR; INTRAVENOUS EVERY 5 MIN PRN
Status: DISCONTINUED | OUTPATIENT
Start: 2023-03-08 | End: 2023-03-08 | Stop reason: HOSPADM

## 2023-03-08 RX ORDER — HYDRALAZINE HYDROCHLORIDE 20 MG/ML
2.5-5 INJECTION INTRAMUSCULAR; INTRAVENOUS EVERY 10 MIN PRN
Status: DISCONTINUED | OUTPATIENT
Start: 2023-03-08 | End: 2023-03-08 | Stop reason: HOSPADM

## 2023-03-08 RX ORDER — HYDROMORPHONE HCL IN WATER/PF 6 MG/30 ML
0.4 PATIENT CONTROLLED ANALGESIA SYRINGE INTRAVENOUS EVERY 5 MIN PRN
Status: DISCONTINUED | OUTPATIENT
Start: 2023-03-08 | End: 2023-03-08 | Stop reason: HOSPADM

## 2023-03-08 RX ORDER — NALOXONE HYDROCHLORIDE 0.4 MG/ML
0.2 INJECTION, SOLUTION INTRAMUSCULAR; INTRAVENOUS; SUBCUTANEOUS
Status: DISCONTINUED | OUTPATIENT
Start: 2023-03-08 | End: 2023-03-08 | Stop reason: HOSPADM

## 2023-03-08 RX ORDER — DEXAMETHASONE SODIUM PHOSPHATE 4 MG/ML
INJECTION, SOLUTION INTRA-ARTICULAR; INTRALESIONAL; INTRAMUSCULAR; INTRAVENOUS; SOFT TISSUE PRN
Status: DISCONTINUED | OUTPATIENT
Start: 2023-03-08 | End: 2023-03-08

## 2023-03-08 RX ORDER — ONDANSETRON 2 MG/ML
INJECTION INTRAMUSCULAR; INTRAVENOUS PRN
Status: DISCONTINUED | OUTPATIENT
Start: 2023-03-08 | End: 2023-03-08

## 2023-03-08 RX ORDER — GLYCOPYRROLATE 0.2 MG/ML
INJECTION, SOLUTION INTRAMUSCULAR; INTRAVENOUS PRN
Status: DISCONTINUED | OUTPATIENT
Start: 2023-03-08 | End: 2023-03-08

## 2023-03-08 RX ORDER — ONDANSETRON 4 MG/1
4 TABLET, ORALLY DISINTEGRATING ORAL EVERY 30 MIN PRN
Status: DISCONTINUED | OUTPATIENT
Start: 2023-03-08 | End: 2023-03-08 | Stop reason: HOSPADM

## 2023-03-08 RX ORDER — HYDROMORPHONE HCL IN WATER/PF 6 MG/30 ML
0.2 PATIENT CONTROLLED ANALGESIA SYRINGE INTRAVENOUS EVERY 5 MIN PRN
Status: DISCONTINUED | OUTPATIENT
Start: 2023-03-08 | End: 2023-03-08 | Stop reason: HOSPADM

## 2023-03-08 RX ORDER — NALOXONE HYDROCHLORIDE 0.4 MG/ML
0.4 INJECTION, SOLUTION INTRAMUSCULAR; INTRAVENOUS; SUBCUTANEOUS
Status: DISCONTINUED | OUTPATIENT
Start: 2023-03-08 | End: 2023-03-08 | Stop reason: HOSPADM

## 2023-03-08 RX ORDER — PROPOFOL 10 MG/ML
INJECTION, EMULSION INTRAVENOUS CONTINUOUS PRN
Status: DISCONTINUED | OUTPATIENT
Start: 2023-03-08 | End: 2023-03-08

## 2023-03-08 RX ORDER — ONDANSETRON 2 MG/ML
4 INJECTION INTRAMUSCULAR; INTRAVENOUS EVERY 30 MIN PRN
Status: DISCONTINUED | OUTPATIENT
Start: 2023-03-08 | End: 2023-03-08 | Stop reason: HOSPADM

## 2023-03-08 RX ORDER — MAGNESIUM SULFATE HEPTAHYDRATE 40 MG/ML
2 INJECTION, SOLUTION INTRAVENOUS
Status: DISCONTINUED | OUTPATIENT
Start: 2023-03-08 | End: 2023-03-08 | Stop reason: HOSPADM

## 2023-03-08 RX ORDER — OXYCODONE HYDROCHLORIDE 5 MG/1
5-10 TABLET ORAL EVERY 4 HOURS PRN
Qty: 10 TABLET | Refills: 0 | Status: SHIPPED | OUTPATIENT
Start: 2023-03-08

## 2023-03-08 RX ORDER — LIDOCAINE HYDROCHLORIDE 20 MG/ML
INJECTION, SOLUTION INFILTRATION; PERINEURAL PRN
Status: DISCONTINUED | OUTPATIENT
Start: 2023-03-08 | End: 2023-03-08

## 2023-03-08 RX ORDER — FENTANYL CITRATE 50 UG/ML
INJECTION, SOLUTION INTRAMUSCULAR; INTRAVENOUS PRN
Status: DISCONTINUED | OUTPATIENT
Start: 2023-03-08 | End: 2023-03-08

## 2023-03-08 RX ADMIN — PROPOFOL 75 MCG/KG/MIN: 10 INJECTION, EMULSION INTRAVENOUS at 11:24

## 2023-03-08 RX ADMIN — GLYCOPYRROLATE 0.1 MCG: 0.2 INJECTION, SOLUTION INTRAMUSCULAR; INTRAVENOUS at 11:46

## 2023-03-08 RX ADMIN — SODIUM CHLORIDE, POTASSIUM CHLORIDE, SODIUM LACTATE AND CALCIUM CHLORIDE: 600; 310; 30; 20 INJECTION, SOLUTION INTRAVENOUS at 10:49

## 2023-03-08 RX ADMIN — GLYCOPYRROLATE 0.1 MCG: 0.2 INJECTION, SOLUTION INTRAMUSCULAR; INTRAVENOUS at 11:30

## 2023-03-08 RX ADMIN — DEXAMETHASONE SODIUM PHOSPHATE 4 MG: 4 INJECTION, SOLUTION INTRA-ARTICULAR; INTRALESIONAL; INTRAMUSCULAR; INTRAVENOUS; SOFT TISSUE at 11:30

## 2023-03-08 RX ADMIN — SODIUM CHLORIDE, POTASSIUM CHLORIDE, SODIUM LACTATE AND CALCIUM CHLORIDE: 600; 310; 30; 20 INJECTION, SOLUTION INTRAVENOUS at 12:02

## 2023-03-08 RX ADMIN — Medication 2 G: at 11:22

## 2023-03-08 RX ADMIN — OXYCODONE HYDROCHLORIDE 5 MG: 5 TABLET ORAL at 12:49

## 2023-03-08 RX ADMIN — MIDAZOLAM 2 MG: 1 INJECTION INTRAMUSCULAR; INTRAVENOUS at 11:22

## 2023-03-08 RX ADMIN — LIDOCAINE HYDROCHLORIDE 40 MG: 20 INJECTION, SOLUTION INFILTRATION; PERINEURAL at 11:24

## 2023-03-08 RX ADMIN — FENTANYL CITRATE 50 MCG: 50 INJECTION, SOLUTION INTRAMUSCULAR; INTRAVENOUS at 11:25

## 2023-03-08 RX ADMIN — ONDANSETRON 4 MG: 2 INJECTION INTRAMUSCULAR; INTRAVENOUS at 11:30

## 2023-03-08 ASSESSMENT — ACTIVITIES OF DAILY LIVING (ADL)
ADLS_ACUITY_SCORE: 35
ADLS_ACUITY_SCORE: 35
ADLS_ACUITY_SCORE: 33

## 2023-03-08 NOTE — PROGRESS NOTES
"SPIRITUAL HEALTH SERVICES  SPIRITUAL ASSESSMENT Progress Note  [facility]. Unit pre-op     REFERRAL SOURCE: Pt request for spiritual care on admission.  Visit with aid of  present.    Provided spiritual support and prayer prior to procedure.    Brandi clarified that she would like her khushi designation updated from Rastafarian to be \"Protestant\" instead. I have updated pt record.    Suri Garcia MDiv  Staff   [facility] routine referrals *[phone]  [facility] available 24/7 for emergent requests/referrals, either by having the on-call  paged or by entering an ASAP/STAT consult in Epic (this will also page the on-call ).      "

## 2023-03-08 NOTE — ANESTHESIA POSTPROCEDURE EVALUATION
Patient: Brandi Powers    Procedure: Procedure(s):  Re-excision of left breast excisional biopsy posterior margin       Anesthesia Type:  MAC    Note:  Disposition: Outpatient   Postop Pain Control: Uneventful            Sign Out: Well controlled pain   PONV: No   Neuro/Psych: Uneventful            Sign Out: Acceptable/Baseline neuro status   Airway/Respiratory: Uneventful            Sign Out: Acceptable/Baseline resp. status   CV/Hemodynamics: Uneventful            Sign Out: Acceptable CV status   Other NRE: NONE   DID A NON-ROUTINE EVENT OCCUR? No           Last vitals:  Vitals Value Taken Time   /68 03/08/23 1305   Temp 97  F (36.1  C) 03/08/23 1305   Pulse 57 03/08/23 1305   Resp 14 03/08/23 1305   SpO2 100 % 03/08/23 1306   Vitals shown include unvalidated device data.    Electronically Signed By: Steve Ocasio MD  March 8, 2023  4:02 PM

## 2023-03-08 NOTE — ANESTHESIA CARE TRANSFER NOTE
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    Anesthesia Type:   MAC     Note:    Oropharynx: oropharynx clear of all foreign objects and spontaneously breathing  Level of Consciousness: awake  Oxygen Supplementation: room air    Independent Airway: airway patency satisfactory and stable  Dentition: dentition unchanged  Vital Signs Stable: post-procedure vital signs reviewed and stable  Report to RN Given: handoff report given  Patient transferred to: Phase II  Comments: No issues   Handoff Report: Identifed the Patient, Identified the Reponsible Provider, Reviewed the pertinent medical history, Discussed the surgical course, Reviewed Intra-OP anesthesia mangement and issues during anesthesia and Allowed opportunity for questions and acknowledgement of understanding      Vitals:  Vitals Value Taken Time   BP     Temp     Pulse     Resp     SpO2 98 % 03/08/23 1209   Vitals shown include unvalidated device data.    Electronically Signed By: MARISOL De Santiago CRNA  March 8, 2023  12:10 PM

## 2023-03-08 NOTE — OP NOTE
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   Anesthesia: Local with MAC    Estimated blood loss:   5 ml     Specimens: ID Type Source Tests Collected by Time Destination   1 : Left Breast Re-excisional biopsy new posterior margin, stitch marks new margin  Tissue Breast, Left SURGICAL PATHOLOGY EXAM Jessika Epps MD 3/8/2023 11:44 AM           INDICATIONS:  Previous left breast excisional biopsy. Final pathology showed a benign phylloides tumor with focal extension to posterior margin.  We discussed reexcision of this margin to reduce the risk of local recurrence and patient agreed to proceed.    DESCRIPTION OF PROCEDURE:  The patient was placed on the table in supine position.  Anesthetic was administered.     The Left breast was prepped and draped in standard sterile fashion.  After anesthetizing the skin, we sharply opened the previous incision at the 12 o'clock position.  We carried the incision down into the breast tissue and cut suture material as we found it until the former cavity was identified. We excised the posterior margin and marked the new margin with a marking stitch and sent the specimen for permanent pathology.    Hemostasis was maintained throughout with electrocautery.  The breast tissue was gently brought together with dyed 2-0 vicryl suture. The skin was closed in layers with 3-0 vicryl interrupted sutures and with running 4-0 Vicryl subcuticular suture and Dermabond.  The patient tolerated the procedure well.  Sponge and instrument counts were correct.    FINDINGS: Posterior biopsy margin reexcised.   Jessika Epps  MD

## 2023-03-08 NOTE — ANESTHESIA PREPROCEDURE EVALUATION
"Anesthesia Pre-Procedure Evaluation    Patient: Brandi Powers   MRN: [id number] : 1975        Procedure : Procedure(s):  Re-excision of left breast excisional biopsy posterior margin          Past Medical History:   Diagnosis Date     Constipation      Hemorrhoids, unspecified hemorrhoid type       Past Surgical History:   Procedure Laterality Date     BIOPSY BREAST Left 2/15/2023    Procedure: LEFT BREAST EXCISIONAL BIOPSY;  Surgeon: Jessika Epps MD;  Location: [facility] OR     BLADDER SURGERY      \" pain in my bladder fixed\"     CHOLECYSTECTOMY       COLONOSCOPY N/A 2021    Procedure: COLONOSCOPY;  Surgeon: Rina Garcia MD;  Location:  GI     CYSTOSCOPY, RETROGRADES, COMBINED Bilateral 10/07/2019    Procedure: Cystoscopy with bilateral retrogrades;  Surgeon: Nelia Armijo MD;  Location: [facility]     DILATION AND CURETTAGE, OPERATIVE HYSTEROSCOPY WITH MORCELLATOR, COMBINED N/A 2019    Procedure: Hysteroscopy, dilation and curettage, polypectomy with morcellator;  Surgeon: Nelia Armijo MD;  Location: [facility]     HEMORRHOIDECTOMY INTERNAL LIGATION N/A 2022    Procedure: LIGATION, HEMORRHOID, INTERNAL;  Surgeon: Rina Garcia MD;  Location:  OR     HYSTERECTOMY VAGINAL, BILATERAL SALPINGO-OOPHERECTOMY, COMBINED Left 10/07/2019    Procedure: Total vaginal hysterectomy, partial left salpingectomy, cystoscopy with bilateral retrogrades;  Surgeon: Nelia Armijo MD;  Location: [facility]     IUD removal, laparoscopy, bilateral tubal ligation, excision of left ovarian mass probable fibroma  2014     LAPAROSCOPIC CYSTECTOMY OVARIAN (BENIGN)  06/10/2014    Procedure: LAPAROSCOPIC CYSTECTOMY OVARIAN (BENIGN);  Surgeon: Sam Ferrari MD;  Location: [facility]     LAPAROSCOPIC TUBAL LIGATION  06/10/2014    Procedure: LAPAROSCOPIC TUBAL LIGATION;  Surgeon: Sam Ferrari MD;  Location:  OR     REMOVE INTRAUTERINE DEVICE  06/10/2014    Procedure: " REMOVE INTRAUTERINE DEVICE;  Surgeon: Sam Ferrari MD;  Location: [facility]      No Known Allergies   Social History     Tobacco Use     Smoking status: Never     Smokeless tobacco: Never   Substance Use Topics     Alcohol use: No      Wt Readings from Last 1 Encounters:   03/08/23 60.6 kg (133 lb 11.2 oz)        Anesthesia Evaluation   Pt has had prior anesthetic. Type: MAC and General.    No history of anesthetic complications       ROS/MED HX  ENT/Pulmonary:  - neg pulmonary ROS     Neurologic:  - neg neurologic ROS     Cardiovascular:  - neg cardiovascular ROS     METS/Exercise Tolerance:     Hematologic:  - neg hematologic  ROS     Musculoskeletal:  - neg musculoskeletal ROS     GI/Hepatic:  - neg GI/hepatic ROS     Renal/Genitourinary:  - neg Renal ROS     Endo:  - neg endo ROS     Psychiatric/Substance Use:  - neg psychiatric ROS     Infectious Disease:  - neg infectious disease ROS     Malignancy:   (+) Malignancy, History of Breast.    Other:            Physical Exam    Airway        Mallampati: II   TM distance: > 3 FB   Neck ROM: full   Mouth opening: > 3 cm    Respiratory Devices and Support         Dental       (+) Minor Abnormalities - some fillings, tiny chips      Cardiovascular          Rhythm and rate: regular and normal     Pulmonary           breath sounds clear to auscultation           OUTSIDE LABS:  CBC:   Lab Results   Component Value Date    WBC 9.4 05/06/2019    WBC 6.4 08/19/2018    HGB 10.7 (L) 10/07/2019    HGB 13.4 05/06/2019    HCT 40.4 05/06/2019    HCT 39.7 08/19/2018     05/06/2019     08/19/2018     BMP:   Lab Results   Component Value Date     05/06/2019     08/19/2018    POTASSIUM 4.0 05/06/2019    POTASSIUM 3.8 08/19/2018    CHLORIDE 113 (H) 05/06/2019    CHLORIDE 109 08/19/2018    CO2 22 05/06/2019    CO2 25 08/19/2018    BUN 20 05/06/2019    BUN 12 08/19/2018    CR 0.53 05/06/2019    CR 0.68 08/19/2018     (H) 05/06/2019    GLC 91  08/19/2018     COAGS: No results found for: PTT, INR, FIBR  POC:   Lab Results   Component Value Date    HCG Negative 10/07/2019     HEPATIC:   Lab Results   Component Value Date    ALBUMIN 3.7 05/06/2019    PROTTOTAL 6.8 05/06/2019    ALT 18 05/06/2019    AST 12 05/06/2019    ALKPHOS 57 05/06/2019    BILITOTAL 0.5 05/06/2019     OTHER:   Lab Results   Component Value Date    JUANJOSE 7.7 (L) 05/06/2019    LIPASE 100 05/06/2019       Anesthesia Plan    ASA Status:  2   NPO Status:  NPO Appropriate    Anesthesia Type: MAC.     - Reason for MAC: straight local not clinically adequate   Induction: Intravenous.   Maintenance: TIVA.        Consents    Anesthesia Plan(s) and associated risks, benefits, and realistic alternatives discussed. Questions answered and patient/representative(s) expressed understanding.    - Discussed:     - Discussed with:  Patient,       - Extended Intubation/Ventilatory Support Discussed: No.      - Patient is DNR/DNI Status: No    Use of blood products discussed: No .     Postoperative Care    Pain management: IV analgesics.   PONV prophylaxis: Ondansetron (or other 5HT-3), Dexamethasone or Solumedrol     Comments:                Steve Ocasio MD

## 2023-03-08 NOTE — DISCHARGE INSTRUCTIONS
GENERAL ANESTHESIA OR SEDATION ADULT DISCHARGE INSTRUCTIONS   SPECIAL PRECAUTIONS FOR 24 HOURS AFTER SURGERY    IT IS NOT UNUSUAL TO FEEL LIGHT-HEADED OR FAINT, UP TO 24 HOURS AFTER SURGERY OR WHILE TAKING PAIN MEDICATION.  IF YOU HAVE THESE SYMPTOMS; SIT FOR A FEW MINUTES BEFORE STANDING AND HAVE SOMEONE ASSIST YOU WHEN YOU GET UP TO WALK OR USE THE BATHROOM.    YOU SHOULD REST AND RELAX FOR THE NEXT 24 HOURS AND YOU MUST MAKE ARRANGEMENTS TO HAVE SOMEONE STAY WITH YOU FOR AT LEAST 24 HOURS AFTER YOUR DISCHARGE.  AVOID HAZARDOUS AND STRENUOUS ACTIVITIES.  DO NOT MAKE IMPORTANT DECISIONS FOR 24 HOURS.    DO NOT DRIVE ANY VEHICLE OR OPERATE MECHANICAL EQUIPMENT FOR 24 HOURS FOLLOWING THE END OF YOUR SURGERY.  EVEN THOUGH YOU MAY FEEL NORMAL, YOUR REACTIONS MAY BE AFFECTED BY THE MEDICATION YOU HAVE RECEIVED.    DO NOT DRINK ALCOHOLIC BEVERAGES FOR 24 HOURS FOLLOWING YOUR SURGERY.    DRINK CLEAR LIQUIDS (APPLE JUICE, GINGER ALE, 7-UP, BROTH, ETC.).  PROGRESS TO YOUR REGULAR DIET AS YOU FEEL ABLE.    YOU MAY HAVE A DRY MOUTH, A SORE THROAT, MUSCLES ACHES OR TROUBLE SLEEPING.  THESE SHOULD GO AWAY AFTER 24 HOURS.    CALL YOUR DOCTOR FOR ANY OF THE FOLLOWING:  SIGNS OF INFECTION (FEVER, GROWING TENDERNESS AT THE SURGERY SITE, A LARGE AMOUNT OF DRAINAGE OR BLEEDING, SEVERE PAIN, FOUL-SMELLING DRAINAGE, REDNESS OR SWELLING.    IT HAS BEEN OVER 8 TO 10 HOURS SINCE SURGERY AND YOU ARE STILL NOT ABLE TO URINATE (PASS WATER).           HOME CARE FOLLOWING MINOR SURGERY  NABOR Sena, ARABELLA Wu, BOBBI Morales, ZAKI Ghotra    RESULTS:  If a biopsy of tissue was done, you may call for your final pathology report after 1p.m. two working days after surgery.  Otherwise, this will be reviewed with you at time of phone follow-up (described below).    INCISIONAL CARE:  If you have a dressing in place, keep clean and dry for 48 hours; you may replace the gauze if it becomes soiled.  After 48 hours you may remove  the dressing and shower.  Do not submerse incision in water for 1 week.  If you have a Dermabond dressing (a type of skin glue), you may shower immediately.  Sutures which are beneath the skin will absorb and do not need to be removed.  Sutures you can see should be removed at your surgeon's office near 2 weeks postop, unless otherwise instructed.  If present, leave the steri-strips (white paper tapes) in place for 14 days after surgery.  If present, leave Dermabond glue in place until it wears/flakes off.  You may expect a small amount of drainage from your incision.  A lump/ridge under the incision is normal and will gradually resolve.  If it becomes red or very uncomfortable, contact the nurse at your surgeon's office to discuss whether this needs to be evaluated.    ACTIVITY:  Cautiously resume exercise and strenuous activities such as jogging, tennis, aerobics, etc. Also, be careful of stretching activities which affect the area of surgery for two weeks.    DIET:  Start with liquids and gradually resume your regular diet as tolerated.  Increased fluid intake is recommended. While taking pain medications, consider use of a stool softener, increase your fiber in your diet, or add a fiber supplement (like Metamucil, Citrucel) to help prevent constipation - a possible side effect of pain medications.    DISCOMFORT:  Local anesthetic placed at surgery should provide relief for 4-8 hours.  Begin taking pain pills before discomfort is severe.  Take the pain medication with some food, when possible, to minimize side effects.  Intermittent use of ice packs may help during the first 1-3 weeks after surgery.  Expect gradual improvement.    Over-the-counter anti-inflammatory medications (i.e. Ibuprofen/Advil/Motrin or Naprosyn/Aleve) may be used per package instructions in addition to or while tapering off the narcotic pain medications to decrease swelling and sensitivity.  DO NOT TAKE these Anti-inflammatory medications  if your primary physician has advised against doing so, or if you have acid reflux, ulcer, or bleeding disorder, or take blood-thinner medications.  Call your primary physician or the surgery office if you have medication questions.      FOLLOW-UP AFTER SURGERY:  -Our office will contact you approximately 2-3 weeks after surgery to check on your progress and answer any questions you may have.  If you are doing well, you will not need to return for an office appointment.  If any concerns are identified over the phone, we will help you make an appointment to see a provider.    -If you have not received a phone call, have any questions or concerns, or would like to be seen, please call us at [phone].  We are located at: [address], [address]; [place] [address]    -CONTACT US IF THE FOLLOWING DEVELOPS:   1. A fever that is above 101     2. Increased redness, warmth, drainage, bleeding, or swelling.   3. Pain that is not relieved by rest/ice and your prescription.   4.  Increasing pain after 48 hours.   5. Drainage that is thick, cloudy, yellow, green or white.   6. Any other questions or concerns.

## 2023-03-10 ENCOUNTER — APPOINTMENT (OUTPATIENT)
Dept: INTERPRETER SERVICES | Facility: CLINIC | Age: 48
End: 2023-03-10
Payer: COMMERCIAL

## 2023-03-10 LAB
PATH REPORT.COMMENTS IMP SPEC: NORMAL
PATH REPORT.COMMENTS IMP SPEC: NORMAL
PATH REPORT.FINAL DX SPEC: NORMAL
PATH REPORT.GROSS SPEC: NORMAL
PATH REPORT.MICROSCOPIC SPEC OTHER STN: NORMAL
PATH REPORT.RELEVANT HX SPEC: NORMAL
PHOTO IMAGE: NORMAL

## (undated) DEVICE — SYR EAR BULB 3OZ 0035830

## (undated) DEVICE — SEAL SET MYOSURE ROD LENS SCOPE SINGLE USE 40-902

## (undated) DEVICE — HEMOSTAT ABSORBABLE ARISTA 5GM SM0007-USA

## (undated) DEVICE — SYR 20ML SLIP TIP W/O NDL 302831

## (undated) DEVICE — SU VICRYL 0 CT-1 CR 8X18" J740D

## (undated) DEVICE — DRAPE LEGGINGS 8421

## (undated) DEVICE — DRAPE MINOR PROCEDURE LAP 29496

## (undated) DEVICE — LINEN HALF SHEET 5512

## (undated) DEVICE — SPECIMEN BAG BEMIS HI FLOW SUCTION WHITE SOCK 533810

## (undated) DEVICE — SOL NACL 0.9% IRRIG 1000ML BOTTLE 2F7124

## (undated) DEVICE — BAG CLEAR TRASH 1.3M 39X33" P4040C

## (undated) DEVICE — PAD CHUX UNDERPAD 23X24" 7136

## (undated) DEVICE — PACK MAJOR LITHOTOMY RIDGES

## (undated) DEVICE — PACK MINOR CUSTOM RIDGES SBA32RMRMA

## (undated) DEVICE — NDL SPINAL 22GA 3.5" QUINCKE 405181

## (undated) DEVICE — CLIP ETHICON LIGACLIP MED WHITE LT200

## (undated) DEVICE — DRSG KERLIX FLUFFS X5

## (undated) DEVICE — ESU GROUND PAD ADULT W/CORD E7507

## (undated) DEVICE — LINEN FULL SHEET 5511

## (undated) DEVICE — SUCTION CANISTER MEDIVAC LINER 3000ML W/LID 65651-530

## (undated) DEVICE — LINEN TOWEL PACK X10 5473

## (undated) DEVICE — SU VICRYL 0 CT-1 CR 8X27" UND JJ41G

## (undated) DEVICE — PACK MINOR LITHOTOMY RIDGES

## (undated) DEVICE — SET BREAST BIOPSY LOCALIZER 20 PROBE 8MM PENCIL 09-0006

## (undated) DEVICE — SU VICRYL 3-0 SH CR 8X18" J774

## (undated) DEVICE — POUCH TISSUE RETRIEVAL ROBOTIC 8MM 5.1" INTRO TRS-ROBO-8

## (undated) DEVICE — SU VICRYL 3-0 SH 27" UND J416H

## (undated) DEVICE — PAD CHUX UNDERPAD 30X36" P3036C

## (undated) DEVICE — BASIN SET MINOR DISP

## (undated) DEVICE — DEVICE TISSUE REMOVAL HYSTEROSCOPIC MYOSURE LITE 30-401LITE

## (undated) DEVICE — SUCTION TIP YANKAUER W/O VENT K86

## (undated) DEVICE — NDL 19GA 1.5"

## (undated) DEVICE — CATH TRAY FOLEY SURESTEP 16FR DRAIN BAG STATOCK A899916

## (undated) DEVICE — SPONGE BALL KERLIX ROUND XL W/O STRING LATEX 4935

## (undated) DEVICE — GLOVE PROTEXIS BLUE W/NEU-THERA 6.0  2D73EB60

## (undated) DEVICE — SOL NACL 0.9% IRRIG 3000ML BAG 2B7477

## (undated) DEVICE — SU VICRYL 4-0 PS-2 18" UND J496H

## (undated) DEVICE — GLOVE PROTEXIS W/NEU-THERA 5.5  2D73TE55

## (undated) DEVICE — SU VICRYL 0 CT-1 27" J340H

## (undated) DEVICE — TUBING SYS AQUILEX BLUE INFLOW AQL-110 YLW OUTFLOW AQL-111

## (undated) DEVICE — GLOVE BIOGEL PI MICRO INDICATOR UNDERGLOVE SZ 6.5 48965

## (undated) DEVICE — TUBING IRRIG TUR Y TYPE 96" LF 6543-01

## (undated) DEVICE — GLOVE BIOGEL PI MICRO SZ 6.5 48565

## (undated) DEVICE — SU SILK 3-0 PS-1 18" 1684G

## (undated) DEVICE — DRAPE LAP PEDS DISP 29492

## (undated) DEVICE — SPONGE LAP 4X18" X8415

## (undated) DEVICE — SYR 30ML SLIP TIP W/O NDL 302833

## (undated) DEVICE — ADH SKIN CLOSURE PREMIERPRO EXOFIN MICOR HV 0.5ML 3471

## (undated) DEVICE — SU VICRYL 0 TIE 12X18" J906G

## (undated) DEVICE — DRAPE LAVH/LAPAROSCOPY W/POUCH 29474

## (undated) DEVICE — SOL WATER IRRIG 1000ML BOTTLE 07139-09

## (undated) DEVICE — SPONGE RAY-TEC 4X8" 7318

## (undated) DEVICE — PAD PERI INDIV WRAP 11" 2022A

## (undated) DEVICE — TUBING SUCTION 12"X1/4" N612

## (undated) DEVICE — GOWN LG DISP 9515

## (undated) DEVICE — PANTIES MESH LG/XLG 2PK 706M2

## (undated) DEVICE — ESU ELEC NDL 1" E1552

## (undated) DEVICE — GLOVE PROTEXIS MICRO 6.0  2D73PM60

## (undated) DEVICE — ESU PENCIL W/SMOKE EVAC CVPLP2000

## (undated) DEVICE — SYR 30ML LL W/O NDL 302832

## (undated) DEVICE — DRAPE UNDER BUTTOCK 89415

## (undated) DEVICE — GLOVE PROTEXIS BLUE W/NEU-THERA 6.5  2D73EB65

## (undated) DEVICE — DECANTER VIAL 2006S

## (undated) DEVICE — LINEN DRAPE 54X72" 5467

## (undated) DEVICE — CATH INTERMITTENT CLEAN-CATH FEMALE 14FR 6" VINYL LF 420614

## (undated) DEVICE — GUIDEWIRE URO STR STIFF .035"X150CM NITINOL 150NSS35

## (undated) DEVICE — CATH URETERAL FLEX TIP TIGERTAIL 06FRX70CM 139006

## (undated) DEVICE — RAD RX ISOVUE 300 (50ML) 61% IOPAMIDOL CHARGE PER ML

## (undated) DEVICE — LINEN TOWEL PACK X5 5464

## (undated) DEVICE — PREP CHLORAPREP 26ML TINTED HI-LITE ORANGE 930815

## (undated) DEVICE — PACK MINOR SBA15MIFSE

## (undated) DEVICE — SU MONOCRYL 3-0 SH 27" Y316H

## (undated) DEVICE — SOL WATER IRRIG 3000ML BAG 2B7117

## (undated) DEVICE — SUCTION CANISTER BEMIS HI FLOW 006772-901

## (undated) RX ORDER — BUPIVACAINE HYDROCHLORIDE 5 MG/ML
INJECTION, SOLUTION EPIDURAL; INTRACAUDAL
Status: DISPENSED
Start: 2022-01-14

## (undated) RX ORDER — GLYCOPYRROLATE 0.2 MG/ML
INJECTION INTRAMUSCULAR; INTRAVENOUS
Status: DISPENSED
Start: 2019-10-07

## (undated) RX ORDER — CEFAZOLIN SODIUM 2 G/100ML
INJECTION, SOLUTION INTRAVENOUS
Status: DISPENSED
Start: 2022-01-14

## (undated) RX ORDER — EPHEDRINE SULFATE 50 MG/ML
INJECTION, SOLUTION INTRAMUSCULAR; INTRAVENOUS; SUBCUTANEOUS
Status: DISPENSED
Start: 2019-10-07

## (undated) RX ORDER — PROPOFOL 10 MG/ML
INJECTION, EMULSION INTRAVENOUS
Status: DISPENSED
Start: 2022-01-14

## (undated) RX ORDER — CEFAZOLIN SODIUM 1 G/3ML
INJECTION, POWDER, FOR SOLUTION INTRAMUSCULAR; INTRAVENOUS
Status: DISPENSED
Start: 2019-10-07

## (undated) RX ORDER — PROMETHAZINE HYDROCHLORIDE 25 MG/ML
INJECTION, SOLUTION INTRAMUSCULAR; INTRAVENOUS
Status: DISPENSED
Start: 2019-10-07

## (undated) RX ORDER — HYDROCODONE BITARTRATE AND ACETAMINOPHEN 5; 325 MG/1; MG/1
TABLET ORAL
Status: DISPENSED
Start: 2019-10-07

## (undated) RX ORDER — KETOROLAC TROMETHAMINE 30 MG/ML
INJECTION, SOLUTION INTRAMUSCULAR; INTRAVENOUS
Status: DISPENSED
Start: 2019-04-09

## (undated) RX ORDER — FENTANYL CITRATE 50 UG/ML
INJECTION, SOLUTION INTRAMUSCULAR; INTRAVENOUS
Status: DISPENSED
Start: 2019-10-07

## (undated) RX ORDER — CEFAZOLIN SODIUM 2 G/100ML
INJECTION, SOLUTION INTRAVENOUS
Status: DISPENSED
Start: 2019-10-07

## (undated) RX ORDER — NEOSTIGMINE METHYLSULFATE 1 MG/ML
VIAL (ML) INJECTION
Status: DISPENSED
Start: 2019-10-07

## (undated) RX ORDER — BUPIVACAINE HYDROCHLORIDE 5 MG/ML
INJECTION, SOLUTION EPIDURAL; INTRACAUDAL
Status: DISPENSED
Start: 2023-02-15

## (undated) RX ORDER — FENTANYL CITRATE 50 UG/ML
INJECTION, SOLUTION INTRAMUSCULAR; INTRAVENOUS
Status: DISPENSED
Start: 2019-04-09

## (undated) RX ORDER — GLYCOPYRROLATE 0.2 MG/ML
INJECTION INTRAMUSCULAR; INTRAVENOUS
Status: DISPENSED
Start: 2019-04-09

## (undated) RX ORDER — FENTANYL CITRATE 50 UG/ML
INJECTION, SOLUTION INTRAMUSCULAR; INTRAVENOUS
Status: DISPENSED
Start: 2023-02-15

## (undated) RX ORDER — ONDANSETRON 2 MG/ML
INJECTION INTRAMUSCULAR; INTRAVENOUS
Status: DISPENSED
Start: 2019-10-07

## (undated) RX ORDER — FENTANYL CITRATE 50 UG/ML
INJECTION, SOLUTION INTRAMUSCULAR; INTRAVENOUS
Status: DISPENSED
Start: 2022-01-14

## (undated) RX ORDER — FENTANYL CITRATE 50 UG/ML
INJECTION, SOLUTION INTRAMUSCULAR; INTRAVENOUS
Status: DISPENSED
Start: 2023-03-08

## (undated) RX ORDER — ONDANSETRON 2 MG/ML
INJECTION INTRAMUSCULAR; INTRAVENOUS
Status: DISPENSED
Start: 2019-04-09

## (undated) RX ORDER — LIDOCAINE HYDROCHLORIDE AND EPINEPHRINE 10; 10 MG/ML; UG/ML
INJECTION, SOLUTION INFILTRATION; PERINEURAL
Status: DISPENSED
Start: 2019-10-07

## (undated) RX ORDER — EPHEDRINE SULFATE 50 MG/ML
INJECTION, SOLUTION INTRAVENOUS
Status: DISPENSED
Start: 2019-10-07

## (undated) RX ORDER — ACETAMINOPHEN 325 MG/1
TABLET ORAL
Status: DISPENSED
Start: 2019-10-07

## (undated) RX ORDER — LIDOCAINE HYDROCHLORIDE 10 MG/ML
INJECTION, SOLUTION EPIDURAL; INFILTRATION; INTRACAUDAL; PERINEURAL
Status: DISPENSED
Start: 2019-04-09

## (undated) RX ORDER — PROPOFOL 10 MG/ML
INJECTION, EMULSION INTRAVENOUS
Status: DISPENSED
Start: 2019-10-07

## (undated) RX ORDER — DEXAMETHASONE SODIUM PHOSPHATE 4 MG/ML
INJECTION, SOLUTION INTRA-ARTICULAR; INTRALESIONAL; INTRAMUSCULAR; INTRAVENOUS; SOFT TISSUE
Status: DISPENSED
Start: 2019-04-09

## (undated) RX ORDER — LIDOCAINE HYDROCHLORIDE 10 MG/ML
INJECTION, SOLUTION EPIDURAL; INFILTRATION; INTRACAUDAL; PERINEURAL
Status: DISPENSED
Start: 2023-03-08

## (undated) RX ORDER — LIDOCAINE HYDROCHLORIDE 10 MG/ML
INJECTION, SOLUTION EPIDURAL; INFILTRATION; INTRACAUDAL; PERINEURAL
Status: DISPENSED
Start: 2023-02-15

## (undated) RX ORDER — CEFAZOLIN SODIUM/WATER 2 G/20 ML
SYRINGE (ML) INTRAVENOUS
Status: DISPENSED
Start: 2023-03-08

## (undated) RX ORDER — HEPARIN SODIUM 5000 [USP'U]/.5ML
INJECTION, SOLUTION INTRAVENOUS; SUBCUTANEOUS
Status: DISPENSED
Start: 2019-10-07

## (undated) RX ORDER — ACETAMINOPHEN 325 MG/1
TABLET ORAL
Status: DISPENSED
Start: 2022-01-14

## (undated) RX ORDER — PROPOFOL 10 MG/ML
INJECTION, EMULSION INTRAVENOUS
Status: DISPENSED
Start: 2023-02-15

## (undated) RX ORDER — CEFAZOLIN SODIUM/WATER 2 G/20 ML
SYRINGE (ML) INTRAVENOUS
Status: DISPENSED
Start: 2023-02-15

## (undated) RX ORDER — GABAPENTIN 300 MG/1
CAPSULE ORAL
Status: DISPENSED
Start: 2019-10-07

## (undated) RX ORDER — BUPIVACAINE HYDROCHLORIDE 5 MG/ML
INJECTION, SOLUTION EPIDURAL; INTRACAUDAL
Status: DISPENSED
Start: 2023-03-08

## (undated) RX ORDER — LIDOCAINE HYDROCHLORIDE 10 MG/ML
INJECTION, SOLUTION EPIDURAL; INFILTRATION; INTRACAUDAL; PERINEURAL
Status: DISPENSED
Start: 2019-10-07

## (undated) RX ORDER — ONDANSETRON 2 MG/ML
INJECTION INTRAMUSCULAR; INTRAVENOUS
Status: DISPENSED
Start: 2023-02-15

## (undated) RX ORDER — FENTANYL CITRATE-0.9 % NACL/PF 10 MCG/ML
PLASTIC BAG, INJECTION (ML) INTRAVENOUS
Status: DISPENSED
Start: 2023-03-08

## (undated) RX ORDER — OXYCODONE HYDROCHLORIDE 5 MG/1
TABLET ORAL
Status: DISPENSED
Start: 2023-03-08

## (undated) RX ORDER — PROPOFOL 10 MG/ML
INJECTION, EMULSION INTRAVENOUS
Status: DISPENSED
Start: 2019-04-09

## (undated) RX ORDER — DEXAMETHASONE SODIUM PHOSPHATE 4 MG/ML
INJECTION, SOLUTION INTRA-ARTICULAR; INTRALESIONAL; INTRAMUSCULAR; INTRAVENOUS; SOFT TISSUE
Status: DISPENSED
Start: 2019-10-07

## (undated) RX ORDER — PHENAZOPYRIDINE HYDROCHLORIDE 200 MG/1
TABLET, FILM COATED ORAL
Status: DISPENSED
Start: 2019-10-07